# Patient Record
Sex: MALE | Race: WHITE | NOT HISPANIC OR LATINO | URBAN - METROPOLITAN AREA
[De-identification: names, ages, dates, MRNs, and addresses within clinical notes are randomized per-mention and may not be internally consistent; named-entity substitution may affect disease eponyms.]

---

## 2017-08-05 ENCOUNTER — EMERGENCY (EMERGENCY)
Facility: HOSPITAL | Age: 67
LOS: 1 days | Discharge: HOME | End: 2017-08-05
Attending: EMERGENCY MEDICINE | Admitting: EMERGENCY MEDICINE
Payer: MEDICARE

## 2017-08-05 VITALS
DIASTOLIC BLOOD PRESSURE: 92 MMHG | OXYGEN SATURATION: 98 % | HEART RATE: 88 BPM | WEIGHT: 145.06 LBS | RESPIRATION RATE: 18 BRPM | SYSTOLIC BLOOD PRESSURE: 130 MMHG | TEMPERATURE: 98 F

## 2017-08-05 DIAGNOSIS — R33.9 RETENTION OF URINE, UNSPECIFIED: ICD-10-CM

## 2017-08-05 DIAGNOSIS — R31.9 HEMATURIA, UNSPECIFIED: ICD-10-CM

## 2017-08-05 LAB
ALBUMIN SERPL ELPH-MCNC: 4.5 G/DL — SIGNIFICANT CHANGE UP (ref 3.3–5)
ALP SERPL-CCNC: 59 U/L — SIGNIFICANT CHANGE UP (ref 40–120)
ALT FLD-CCNC: 24 U/L — SIGNIFICANT CHANGE UP (ref 10–45)
ANION GAP SERPL CALC-SCNC: 13 MMOL/L — SIGNIFICANT CHANGE UP (ref 5–17)
AST SERPL-CCNC: 32 U/L — SIGNIFICANT CHANGE UP (ref 10–40)
BASOPHILS NFR BLD AUTO: 0.2 % — SIGNIFICANT CHANGE UP (ref 0–2)
BILIRUB SERPL-MCNC: 0.6 MG/DL — SIGNIFICANT CHANGE UP (ref 0.2–1.2)
BUN SERPL-MCNC: 22 MG/DL — SIGNIFICANT CHANGE UP (ref 7–23)
CALCIUM SERPL-MCNC: 10.1 MG/DL — SIGNIFICANT CHANGE UP (ref 8.4–10.5)
CHLORIDE SERPL-SCNC: 101 MMOL/L — SIGNIFICANT CHANGE UP (ref 96–108)
CO2 SERPL-SCNC: 25 MMOL/L — SIGNIFICANT CHANGE UP (ref 22–31)
CREAT SERPL-MCNC: 0.9 MG/DL — SIGNIFICANT CHANGE UP (ref 0.5–1.3)
EOSINOPHIL NFR BLD AUTO: 2 % — SIGNIFICANT CHANGE UP (ref 0–6)
GLUCOSE SERPL-MCNC: 155 MG/DL — HIGH (ref 70–99)
HCT VFR BLD CALC: 40.6 % — SIGNIFICANT CHANGE UP (ref 39–50)
HGB BLD-MCNC: 14.3 G/DL — SIGNIFICANT CHANGE UP (ref 13–17)
LYMPHOCYTES # BLD AUTO: 36.5 % — SIGNIFICANT CHANGE UP (ref 13–44)
MCHC RBC-ENTMCNC: 31.2 PG — SIGNIFICANT CHANGE UP (ref 27–34)
MCHC RBC-ENTMCNC: 35.2 G/DL — SIGNIFICANT CHANGE UP (ref 32–36)
MCV RBC AUTO: 88.6 FL — SIGNIFICANT CHANGE UP (ref 80–100)
MONOCYTES NFR BLD AUTO: 12.9 % — SIGNIFICANT CHANGE UP (ref 2–14)
NEUTROPHILS NFR BLD AUTO: 48.4 % — SIGNIFICANT CHANGE UP (ref 43–77)
PLATELET # BLD AUTO: 148 K/UL — LOW (ref 150–400)
POTASSIUM SERPL-MCNC: 4.4 MMOL/L — SIGNIFICANT CHANGE UP (ref 3.5–5.3)
POTASSIUM SERPL-SCNC: 4.4 MMOL/L — SIGNIFICANT CHANGE UP (ref 3.5–5.3)
PROT SERPL-MCNC: 7 G/DL — SIGNIFICANT CHANGE UP (ref 6–8.3)
RBC # BLD: 4.58 M/UL — SIGNIFICANT CHANGE UP (ref 4.2–5.8)
RBC # FLD: 12.5 % — SIGNIFICANT CHANGE UP (ref 10.3–16.9)
SODIUM SERPL-SCNC: 139 MMOL/L — SIGNIFICANT CHANGE UP (ref 135–145)
WBC # BLD: 4.9 K/UL — SIGNIFICANT CHANGE UP (ref 3.8–10.5)
WBC # FLD AUTO: 4.9 K/UL — SIGNIFICANT CHANGE UP (ref 3.8–10.5)

## 2017-08-05 PROCEDURE — 74176 CT ABD & PELVIS W/O CONTRAST: CPT | Mod: 26

## 2017-08-05 PROCEDURE — 99284 EMERGENCY DEPT VISIT MOD MDM: CPT

## 2017-08-05 RX ORDER — CIPROFLOXACIN LACTATE 400MG/40ML
500 VIAL (ML) INTRAVENOUS ONCE
Qty: 0 | Refills: 0 | Status: COMPLETED | OUTPATIENT
Start: 2017-08-05 | End: 2017-08-05

## 2017-08-05 RX ORDER — MOXIFLOXACIN HYDROCHLORIDE TABLETS, 400 MG 400 MG/1
1 TABLET, FILM COATED ORAL
Qty: 6 | Refills: 0 | OUTPATIENT
Start: 2017-08-05 | End: 2017-08-08

## 2017-08-05 NOTE — ED PROVIDER NOTE - ATTENDING CONTRIBUTION TO CARE
67 y/o male with hx of kidney stones, enlarged prostate, HTN c/o urinary retention. pt states hematuria began yesterday and began to improve today. + clots.  Pt unable to urinate since 4 pm - unable to put in maciel in ED, urology called and maciel placed - ct findings reviewed and pt aware of possible mass, understands importance of follow up for cystoscopy as outpatient.  leg bag applied and cipro given as per urology

## 2017-08-05 NOTE — ED ADULT NURSE NOTE - CHPI ED SYMPTOMS NEG
no blood in stool/no diarrhea/no abdominal distension/no burning urination/no fever/no chills/no nausea/no vomiting

## 2017-08-05 NOTE — CONSULT NOTE ADULT - SUBJECTIVE AND OBJECTIVE BOX
66 y.o. M patient presenting with urinary retention since 4pm today.  Patient notes having hematuria and difficulty voiding yesterday, but it initially improved with increased po water intake.  He has sensation of full bladder, but denies pain.  No dysuria.  No N/V/D/F.  No other urinary symptoms.  No recent  surgery.  Had a prostate biopsy in January, which patient states was negative.  H/o kidney stones s/p laser surgery 20 years ago.    PAST MEDICAL & SURGICAL HISTORY:  Enlarged prostate  Calculus of kidney: Nephrolithiasis    ALLERGIES:  No Known Allergies    Vital Signs Last 24 Hrs  T(C): 36.9 (05 Aug 2017 21:01), Max: 36.9 (05 Aug 2017 21:01)  T(F): 98.4 (05 Aug 2017 21:01), Max: 98.4 (05 Aug 2017 21:01)  HR: 88 (05 Aug 2017 21:01) (88 - 88)  BP: 130/92 (05 Aug 2017 21:01) (130/92 - 130/92)  BP(mean): --  RR: 18 (05 Aug 2017 21:01) (18 - 18)  SpO2: 98% (05 Aug 2017 21:01) (98% - 98%)                          14.3   4.9   )-----------( 148      ( 05 Aug 2017 21:50 )             40.6   08-05    139  |  101  |  22  ----------------------------<  155<H>  4.4   |  25  |  0.90    Ca    10.1      05 Aug 2017 21:50    TPro  7.0  /  Alb  4.5  /  TBili  0.6  /  DBili  x   /  AST  32  /  ALT  24  /  AlkPhos  59  08-05      CT abd/pel: IMPRESSION:   1.  No renal or ureteral calculus. Markedly distended urinary bladder   with an intrinsic 0.4 cm stone. 4 cm posterior bladder diverticulum.  2.  Irregular soft tissue density overlying the right/inferior aspect of   the urinary bladder. This raises suspicion for a bladder neoplasm in the   setting of hematuria. Recommend either nonemergent CT urogram and/or   cystoscopy for further characterization.  3.  Prostatomegaly.        Gen: AAOx3, partner at bedside  Abd: soft, suprapubic distension, mild tender, no guarding or ridigity  : 18F coude placed, with 400cc draining to bag.  Several large clots removed with irrigation. Now draining clear, pink color urine. 66 y.o. M patient presenting with urinary retention since 4pm today.  Patient notes having hematuria and difficulty voiding yesterday, but it initially improved with increased po water intake.  He has sensation of full bladder, but denies pain.  No dysuria.  No N/V/D/F.  No other urinary symptoms.  No recent  surgery.  Had a prostate biopsy in January, which patient states was negative.  H/o kidney stones s/p laser surgery 20 years ago.  Outside urologist is at [facility] [place] (?Dr Poon)    PAST MEDICAL & SURGICAL HISTORY:  Enlarged prostate  Calculus of kidney: Nephrolithiasis    ALLERGIES:  No Known Allergies    Vital Signs Last 24 Hrs  T(C): 36.9 (05 Aug 2017 21:01), Max: 36.9 (05 Aug 2017 21:01)  T(F): 98.4 (05 Aug 2017 21:01), Max: 98.4 (05 Aug 2017 21:01)  HR: 88 (05 Aug 2017 21:01) (88 - 88)  BP: 130/92 (05 Aug 2017 21:01) (130/92 - 130/92)  BP(mean): --  RR: 18 (05 Aug 2017 21:01) (18 - 18)  SpO2: 98% (05 Aug 2017 21:01) (98% - 98%)                          14.3   4.9   )-----------( 148      ( 05 Aug 2017 21:50 )             40.6   08-05    139  |  101  |  22  ----------------------------<  155<H>  4.4   |  25  |  0.90    Ca    10.1      05 Aug 2017 21:50    TPro  7.0  /  Alb  4.5  /  TBili  0.6  /  DBili  x   /  AST  32  /  ALT  24  /  AlkPhos  59  08-05      CT abd/pel: IMPRESSION:   1.  No renal or ureteral calculus. Markedly distended urinary bladder   with an intrinsic 0.4 cm stone. 4 cm posterior bladder diverticulum.  2.  Irregular soft tissue density overlying the right/inferior aspect of   the urinary bladder. This raises suspicion for a bladder neoplasm in the   setting of hematuria. Recommend either nonemergent CT urogram and/or   cystoscopy for further characterization.  3.  Prostatomegaly.        Gen: AAOx3, partner at bedside  Abd: soft, suprapubic distension, mild tender, no guarding or ridigity  : 18F coude placed, with 400cc draining to bag.  Several large clots removed with irrigation. Now draining clear, peach to pink color urine.

## 2017-08-05 NOTE — ED PROVIDER NOTE - OBJECTIVE STATEMENT
67 y/o male with hx of kidney stones, enlarged prostate, HTN c/o urinary retention. pt states hematuria began yesterday and began to improve today. + clots. pt reports unable to urinate since 4 pm today. no fever or chills. no abd pain, back pain, n/v. no testicular pain. no recent prostate procedures. no further complaints.

## 2017-08-05 NOTE — ED ADULT NURSE NOTE - OBJECTIVE STATEMENT
pt. with hx of BPH presented with c/o urinary retention since 4pm today, pt. is A&Ox3, communicates w/o difficulty, denies chest pain, shortness of breath, n/v/d, back pain, fever, chills.

## 2017-08-05 NOTE — CONSULT NOTE ADULT - ASSESSMENT
66 y.o. M patient presenting with urinary clot retention.  CT findings showing large bladder stone and questionable soft tissue mass in bladder. 66 y.o. M patient presenting with urinary clot retention.  CT findings showing large bladder stone, bladder diverticulum, and questionable soft tissue mass in bladder.

## 2017-08-05 NOTE — ED ADULT NURSE REASSESSMENT NOTE - NS ED NURSE REASSESS COMMENT FT1
Attempted to insert Coudee cath #16, resistance met, ELLIOT Rodriges aware, awaiting urology consult.

## 2017-08-05 NOTE — CONSULT NOTE ADULT - PROBLEM SELECTOR RECOMMENDATION 9
-Case discussed with on call  resident/chief.    -CT finding could represent mass vs clot, will need outpatient work up including CT Urogram and Cystoscopy.  -Please send Ua, Ucx, and Urine cytology  -Cipro x 3 days  -Follow up with Dr Shannon outpatient this week, home with maciel to leg bag if urine output remains clear  -Return if maciel not draining, pain, or worsening symptoms -Case discussed with on call  resident/chief.    -CT finding could represent mass vs clot (imaging was done prior to maciel placement and irrigation), will need further outpatient work up including CT Urogram and Cystoscopy.  -Please send Ua, Ucx, and Urine cytology  -Cipro x 3 days  -Follow up with Dr Shannon outpatient this week, home with maciel to leg bag if urine output remains clear  -Patient offered option for admission for observation, he declines at present.  Would prefer to monitor from home.  Return precautions given if maciel not draining, pain, or worsening symptoms.  Educated to drink plenty water.

## 2017-08-05 NOTE — ED PROVIDER NOTE - MEDICAL DECISION MAKING DETAILS
hematuria and urinary retention. pt well appearing. non tender on exam. a febrile. unable to place maciel by RN. Urology consulted and maciel placed. labs noted. ct scan done given hx of stone. + stone in bladder and ? soft tissue mass in bladder. results d/w pt and copy of report given to pt. f/u with urology this week for outpt cystoscopy. urology recommends cipro x 3 days. cx sent

## 2017-08-06 ENCOUNTER — EMERGENCY (EMERGENCY)
Facility: HOSPITAL | Age: 67
LOS: 1 days | Discharge: HOME | End: 2017-08-06
Attending: EMERGENCY MEDICINE | Admitting: EMERGENCY MEDICINE
Payer: MEDICARE

## 2017-08-06 VITALS
DIASTOLIC BLOOD PRESSURE: 80 MMHG | SYSTOLIC BLOOD PRESSURE: 124 MMHG | HEART RATE: 82 BPM | OXYGEN SATURATION: 100 % | RESPIRATION RATE: 18 BRPM | TEMPERATURE: 98 F

## 2017-08-06 VITALS
SYSTOLIC BLOOD PRESSURE: 123 MMHG | HEART RATE: 78 BPM | HEIGHT: 67 IN | WEIGHT: 145.06 LBS | DIASTOLIC BLOOD PRESSURE: 83 MMHG | TEMPERATURE: 98 F | RESPIRATION RATE: 18 BRPM

## 2017-08-06 DIAGNOSIS — Z79.2 LONG TERM (CURRENT) USE OF ANTIBIOTICS: ICD-10-CM

## 2017-08-06 DIAGNOSIS — R31.9 HEMATURIA, UNSPECIFIED: ICD-10-CM

## 2017-08-06 DIAGNOSIS — R33.9 RETENTION OF URINE, UNSPECIFIED: ICD-10-CM

## 2017-08-06 LAB
APPEARANCE UR: (no result)
BACTERIA # UR AUTO: PRESENT /HPF
BILIRUB UR-MCNC: NEGATIVE — SIGNIFICANT CHANGE UP
COLOR SPEC: (no result)
DIFF PNL FLD: (no result)
EPI CELLS # UR: SIGNIFICANT CHANGE UP /HPF
GLUCOSE UR QL: NEGATIVE — SIGNIFICANT CHANGE UP
KETONES UR-MCNC: NEGATIVE — SIGNIFICANT CHANGE UP
LEUKOCYTE ESTERASE UR-ACNC: (no result)
NITRITE UR-MCNC: NEGATIVE — SIGNIFICANT CHANGE UP
PH UR: 7 — SIGNIFICANT CHANGE UP (ref 5–8)
PROT UR-MCNC: >=300 MG/DL
RBC CASTS # UR COMP ASSIST: (no result) /HPF
SP GR SPEC: 1.01 — SIGNIFICANT CHANGE UP (ref 1–1.03)
UROBILINOGEN FLD QL: 0.2 E.U./DL — SIGNIFICANT CHANGE UP
WBC UR QL: (no result) /HPF

## 2017-08-06 PROCEDURE — 51702 INSERT TEMP BLADDER CATH: CPT

## 2017-08-06 PROCEDURE — 80053 COMPREHEN METABOLIC PANEL: CPT

## 2017-08-06 PROCEDURE — 85025 COMPLETE CBC W/AUTO DIFF WBC: CPT

## 2017-08-06 PROCEDURE — 87086 URINE CULTURE/COLONY COUNT: CPT

## 2017-08-06 PROCEDURE — 99283 EMERGENCY DEPT VISIT LOW MDM: CPT | Mod: 25

## 2017-08-06 PROCEDURE — 74176 CT ABD & PELVIS W/O CONTRAST: CPT

## 2017-08-06 PROCEDURE — 99283 EMERGENCY DEPT VISIT LOW MDM: CPT

## 2017-08-06 PROCEDURE — 81001 URINALYSIS AUTO W/SCOPE: CPT

## 2017-08-06 PROCEDURE — 99284 EMERGENCY DEPT VISIT MOD MDM: CPT | Mod: 25

## 2017-08-06 RX ADMIN — Medication 500 MILLIGRAM(S): at 00:18

## 2017-08-06 NOTE — ED ADULT NURSE NOTE - OBJECTIVE STATEMENT
Pt c/o urine leakage around meatus status post  Khan insertion on 08/05/2017. Pt had 18 Fr Khan catheter. Notable blood in the at the lenin of the leg bag. No urine present in the leg bad. Last emptied "this morning." Pt denies pain, fever chills. Leg bad changes to bed side beg. While changing two blood clots came out of the distal end of catheter and dark yellow urines started to flow. Total of 800  mL of urine produced. Pt verbalized feeling better. No distress noted. Pending MD evaluation.

## 2017-08-06 NOTE — ED ADULT TRIAGE NOTE - OTHER COMPLAINTS
pt c/o leaking around catheter site since 10 am this morning. pt maciel cath placed last night 8/5/17. pt c/o pelvic discomfort 5/10. no n/v/d. no fever or chills.

## 2017-08-06 NOTE — ED PROVIDER NOTE - MEDICAL DECISION MAKING DETAILS
67yo male with urinary retention secondary to hematuria/ clot formation. Catheter was replaced with large clot removed from maciel. Now draining dark but clear urine. Pt feeling better. Instructed him to follow up with Dr. Shannon in 1-2days for removal.

## 2017-08-06 NOTE — ED PROVIDER NOTE - OBJECTIVE STATEMENT
65yo male with BPH, bladder stone presenting with urinary retention 8/5 back today with leaking of maciel around meatus. Pt also complains of decreased urine in bag as well as urinary retention. Pt denies fever, chills or back pain.

## 2017-08-06 NOTE — ED ADULT NURSE REASSESSMENT NOTE - NS ED NURSE REASSESS COMMENT FT1
Pt educated on proper use of leg bag with return demonstration. No distress noted. Bed side bag changed to leg bag.

## 2017-08-08 LAB
CULTURE RESULTS: NO GROWTH — SIGNIFICANT CHANGE UP
SPECIMEN SOURCE: SIGNIFICANT CHANGE UP

## 2017-11-24 ENCOUNTER — TRANSCRIPTION ENCOUNTER (OUTPATIENT)
Age: 67
End: 2017-11-24

## 2017-11-26 ENCOUNTER — FORM ENCOUNTER (OUTPATIENT)
Age: 67
End: 2017-11-26

## 2017-11-27 ENCOUNTER — APPOINTMENT (OUTPATIENT)
Dept: ORTHOPEDIC SURGERY | Facility: CLINIC | Age: 67
End: 2017-11-27
Payer: MEDICARE

## 2017-11-27 ENCOUNTER — APPOINTMENT (OUTPATIENT)
Dept: RADIOLOGY | Facility: CLINIC | Age: 67
End: 2017-11-27
Payer: MEDICARE

## 2017-11-27 ENCOUNTER — OUTPATIENT (OUTPATIENT)
Dept: OUTPATIENT SERVICES | Facility: HOSPITAL | Age: 67
LOS: 1 days | End: 2017-11-27

## 2017-11-27 VITALS — HEIGHT: 67 IN | BODY MASS INDEX: 22.29 KG/M2 | WEIGHT: 142 LBS

## 2017-11-27 DIAGNOSIS — Z86.39 PERSONAL HISTORY OF OTHER ENDOCRINE, NUTRITIONAL AND METABOLIC DISEASE: ICD-10-CM

## 2017-11-27 DIAGNOSIS — Z78.9 OTHER SPECIFIED HEALTH STATUS: ICD-10-CM

## 2017-11-27 PROCEDURE — 99204 OFFICE O/P NEW MOD 45 MIN: CPT | Mod: 57

## 2017-11-27 PROCEDURE — 27780 TREATMENT OF FIBULA FRACTURE: CPT | Mod: LT

## 2017-11-27 PROCEDURE — 73610 X-RAY EXAM OF ANKLE: CPT | Mod: 26,LT

## 2017-11-29 ENCOUNTER — APPOINTMENT (OUTPATIENT)
Dept: ORTHOPEDIC SURGERY | Facility: CLINIC | Age: 67
End: 2017-11-29

## 2017-11-29 PROBLEM — Z86.39 HISTORY OF HIGH CHOLESTEROL: Status: RESOLVED | Noted: 2017-11-27 | Resolved: 2017-11-29

## 2017-11-29 PROBLEM — Z78.9 REGULAR ALCOHOL CONSUMPTION: Status: ACTIVE | Noted: 2017-11-27

## 2017-12-15 ENCOUNTER — TRANSCRIPTION ENCOUNTER (OUTPATIENT)
Age: 67
End: 2017-12-15

## 2017-12-17 ENCOUNTER — FORM ENCOUNTER (OUTPATIENT)
Age: 67
End: 2017-12-17

## 2017-12-18 ENCOUNTER — OUTPATIENT (OUTPATIENT)
Dept: OUTPATIENT SERVICES | Facility: HOSPITAL | Age: 67
LOS: 1 days | End: 2017-12-18

## 2017-12-18 ENCOUNTER — APPOINTMENT (OUTPATIENT)
Dept: RADIOLOGY | Facility: CLINIC | Age: 67
End: 2017-12-18
Payer: MEDICARE

## 2017-12-18 ENCOUNTER — APPOINTMENT (OUTPATIENT)
Dept: ORTHOPEDIC SURGERY | Facility: CLINIC | Age: 67
End: 2017-12-18
Payer: MEDICARE

## 2017-12-18 VITALS — BODY MASS INDEX: 22.29 KG/M2 | RESPIRATION RATE: 16 BRPM | HEIGHT: 67 IN | WEIGHT: 142 LBS

## 2017-12-18 DIAGNOSIS — M85.89 OTHER SPECIFIED DISORDERS OF BONE DENSITY AND STRUCTURE, MULTIPLE SITES: ICD-10-CM

## 2017-12-18 PROCEDURE — 73610 X-RAY EXAM OF ANKLE: CPT | Mod: 26,LT

## 2017-12-18 PROCEDURE — 99213 OFFICE O/P EST LOW 20 MIN: CPT | Mod: 24

## 2017-12-18 PROCEDURE — 73610 X-RAY EXAM OF ANKLE: CPT | Mod: LT

## 2017-12-18 PROCEDURE — 73590 X-RAY EXAM OF LOWER LEG: CPT | Mod: 26,LT

## 2017-12-18 PROCEDURE — 73590 X-RAY EXAM OF LOWER LEG: CPT | Mod: LT

## 2017-12-25 ENCOUNTER — FORM ENCOUNTER (OUTPATIENT)
Age: 67
End: 2017-12-25

## 2017-12-26 ENCOUNTER — OUTPATIENT (OUTPATIENT)
Dept: OUTPATIENT SERVICES | Facility: HOSPITAL | Age: 67
LOS: 1 days | End: 2017-12-26

## 2017-12-26 ENCOUNTER — APPOINTMENT (OUTPATIENT)
Dept: ULTRASOUND IMAGING | Facility: CLINIC | Age: 67
End: 2017-12-26
Payer: MEDICARE

## 2017-12-26 ENCOUNTER — APPOINTMENT (OUTPATIENT)
Dept: RADIOLOGY | Facility: CLINIC | Age: 67
End: 2017-12-26
Payer: MEDICARE

## 2017-12-26 ENCOUNTER — APPOINTMENT (OUTPATIENT)
Dept: ORTHOPEDIC SURGERY | Facility: CLINIC | Age: 67
End: 2017-12-26
Payer: MEDICARE

## 2017-12-26 VITALS — WEIGHT: 142 LBS | HEIGHT: 67 IN | RESPIRATION RATE: 16 BRPM | BODY MASS INDEX: 22.29 KG/M2

## 2017-12-26 DIAGNOSIS — M25.562 PAIN IN LEFT KNEE: ICD-10-CM

## 2017-12-26 PROCEDURE — 99214 OFFICE O/P EST MOD 30 MIN: CPT | Mod: 24

## 2017-12-26 PROCEDURE — 73564 X-RAY EXAM KNEE 4 OR MORE: CPT | Mod: 26,LT

## 2017-12-26 PROCEDURE — 93971 EXTREMITY STUDY: CPT | Mod: 26,LT

## 2018-01-02 ENCOUNTER — APPOINTMENT (OUTPATIENT)
Dept: ORTHOPEDIC SURGERY | Facility: CLINIC | Age: 68
End: 2018-01-02
Payer: MEDICARE

## 2018-01-02 DIAGNOSIS — M25.462 EFFUSION, LEFT KNEE: ICD-10-CM

## 2018-01-02 PROCEDURE — 20610 DRAIN/INJ JOINT/BURSA W/O US: CPT | Mod: 58,LT

## 2018-01-02 PROCEDURE — 99024 POSTOP FOLLOW-UP VISIT: CPT

## 2018-01-10 ENCOUNTER — APPOINTMENT (OUTPATIENT)
Dept: VASCULAR SURGERY | Facility: CLINIC | Age: 68
End: 2018-01-10
Payer: MEDICARE

## 2018-01-10 VITALS
OXYGEN SATURATION: 98 % | TEMPERATURE: 98.2 F | DIASTOLIC BLOOD PRESSURE: 90 MMHG | HEART RATE: 108 BPM | SYSTOLIC BLOOD PRESSURE: 129 MMHG

## 2018-01-10 DIAGNOSIS — I83.893 VARICOSE VEINS OF BILATERAL LOWER EXTREMITIES WITH OTHER COMPLICATIONS: ICD-10-CM

## 2018-01-10 DIAGNOSIS — I83.12 VARICOSE VEINS OF RIGHT LOWER EXTREMITY WITH INFLAMMATION: ICD-10-CM

## 2018-01-10 DIAGNOSIS — I83.11 VARICOSE VEINS OF RIGHT LOWER EXTREMITY WITH INFLAMMATION: ICD-10-CM

## 2018-01-10 DIAGNOSIS — M79.89 OTHER SPECIFIED SOFT TISSUE DISORDERS: ICD-10-CM

## 2018-01-10 LAB — CRYSTALS SNV QL MICRO: NORMAL

## 2018-01-10 PROCEDURE — 99215 OFFICE O/P EST HI 40 MIN: CPT | Mod: 25

## 2018-01-10 PROCEDURE — 93970 EXTREMITY STUDY: CPT

## 2018-01-16 ENCOUNTER — APPOINTMENT (OUTPATIENT)
Dept: VASCULAR SURGERY | Facility: CLINIC | Age: 68
End: 2018-01-16
Payer: MEDICARE

## 2018-01-16 VITALS
HEART RATE: 95 BPM | TEMPERATURE: 97.8 F | SYSTOLIC BLOOD PRESSURE: 148 MMHG | DIASTOLIC BLOOD PRESSURE: 100 MMHG | OXYGEN SATURATION: 96 %

## 2018-01-16 PROCEDURE — 36478 ENDOVENOUS LASER 1ST VEIN: CPT | Mod: LT

## 2018-01-17 ENCOUNTER — APPOINTMENT (OUTPATIENT)
Dept: ORTHOPEDIC SURGERY | Facility: CLINIC | Age: 68
End: 2018-01-17

## 2018-01-21 ENCOUNTER — FORM ENCOUNTER (OUTPATIENT)
Age: 68
End: 2018-01-21

## 2018-01-22 ENCOUNTER — EMERGENCY (EMERGENCY)
Facility: HOSPITAL | Age: 68
LOS: 1 days | Discharge: HOME | End: 2018-01-22
Admitting: EMERGENCY MEDICINE
Payer: MEDICARE

## 2018-01-22 ENCOUNTER — APPOINTMENT (OUTPATIENT)
Dept: ORTHOPEDIC SURGERY | Facility: CLINIC | Age: 68
End: 2018-01-22
Payer: MEDICARE

## 2018-01-22 ENCOUNTER — APPOINTMENT (OUTPATIENT)
Dept: RADIOLOGY | Facility: CLINIC | Age: 68
End: 2018-01-22
Payer: MEDICARE

## 2018-01-22 ENCOUNTER — OUTPATIENT (OUTPATIENT)
Dept: OUTPATIENT SERVICES | Facility: HOSPITAL | Age: 68
LOS: 1 days | End: 2018-01-22

## 2018-01-22 VITALS
RESPIRATION RATE: 16 BRPM | SYSTOLIC BLOOD PRESSURE: 138 MMHG | WEIGHT: 142.64 LBS | DIASTOLIC BLOOD PRESSURE: 89 MMHG | OXYGEN SATURATION: 99 % | HEART RATE: 84 BPM | TEMPERATURE: 98 F

## 2018-01-22 VITALS — HEIGHT: 67 IN | WEIGHT: 142 LBS | RESPIRATION RATE: 16 BRPM | BODY MASS INDEX: 22.29 KG/M2

## 2018-01-22 DIAGNOSIS — M80.00XA AGE-RELATED OSTEOPOROSIS WITH CURRENT PATHOLOGICAL FRACTURE, UNSPECIFIED SITE, INITIAL ENCOUNTER FOR FRACTURE: ICD-10-CM

## 2018-01-22 DIAGNOSIS — S82.865A NONDISPLACED MAISONNEUVE'S FRACTURE OF LEFT LEG, INITIAL ENCOUNTER FOR CLOSED FRACTURE: ICD-10-CM

## 2018-01-22 LAB
ANION GAP SERPL CALC-SCNC: 3 MMOL/L — LOW (ref 9–16)
BASOPHILS NFR BLD AUTO: 0.5 % — SIGNIFICANT CHANGE UP (ref 0–2)
BUN SERPL-MCNC: 13 MG/DL — SIGNIFICANT CHANGE UP (ref 7–23)
CALCIUM SERPL-MCNC: 9.4 MG/DL — SIGNIFICANT CHANGE UP (ref 8.5–10.5)
CHLORIDE SERPL-SCNC: 104 MMOL/L — SIGNIFICANT CHANGE UP (ref 96–108)
CO2 SERPL-SCNC: 31 MMOL/L — SIGNIFICANT CHANGE UP (ref 22–31)
CREAT SERPL-MCNC: 0.85 MG/DL — SIGNIFICANT CHANGE UP (ref 0.5–1.3)
CRP SERPL-MCNC: <0.2 MG/DL — SIGNIFICANT CHANGE UP (ref 0–0.9)
EOSINOPHIL NFR BLD AUTO: 1.5 % — SIGNIFICANT CHANGE UP (ref 0–6)
GLUCOSE SERPL-MCNC: 97 MG/DL — SIGNIFICANT CHANGE UP (ref 70–99)
HCT VFR BLD CALC: 42.2 % — SIGNIFICANT CHANGE UP (ref 39–50)
HGB BLD-MCNC: 14 G/DL — SIGNIFICANT CHANGE UP (ref 13–17)
IMM GRANULOCYTES NFR BLD AUTO: 0.5 % — SIGNIFICANT CHANGE UP (ref 0–1.5)
LYMPHOCYTES # BLD AUTO: 25.7 % — SIGNIFICANT CHANGE UP (ref 13–44)
MCHC RBC-ENTMCNC: 30.7 PG — SIGNIFICANT CHANGE UP (ref 27–34)
MCHC RBC-ENTMCNC: 33.2 G/DL — SIGNIFICANT CHANGE UP (ref 32–36)
MCV RBC AUTO: 92.5 FL — SIGNIFICANT CHANGE UP (ref 80–100)
MONOCYTES NFR BLD AUTO: 11.4 % — SIGNIFICANT CHANGE UP (ref 2–14)
NEUTROPHILS NFR BLD AUTO: 60.4 % — SIGNIFICANT CHANGE UP (ref 43–77)
PLATELET # BLD AUTO: 164 K/UL — SIGNIFICANT CHANGE UP (ref 150–400)
POTASSIUM SERPL-MCNC: 4.2 MMOL/L — SIGNIFICANT CHANGE UP (ref 3.5–5.3)
POTASSIUM SERPL-SCNC: 4.2 MMOL/L — SIGNIFICANT CHANGE UP (ref 3.5–5.3)
RBC # BLD: 4.56 M/UL — SIGNIFICANT CHANGE UP (ref 4.2–5.8)
RBC # FLD: 12.6 % — SIGNIFICANT CHANGE UP (ref 10.3–16.9)
SODIUM SERPL-SCNC: 138 MMOL/L — SIGNIFICANT CHANGE UP (ref 132–145)
WBC # BLD: 4 K/UL — SIGNIFICANT CHANGE UP (ref 3.8–10.5)
WBC # FLD AUTO: 4 K/UL — SIGNIFICANT CHANGE UP (ref 3.8–10.5)

## 2018-01-22 PROCEDURE — 99214 OFFICE O/P EST MOD 30 MIN: CPT | Mod: 24

## 2018-01-22 PROCEDURE — 99284 EMERGENCY DEPT VISIT MOD MDM: CPT

## 2018-01-22 PROCEDURE — 93971 EXTREMITY STUDY: CPT | Mod: 26,LT

## 2018-01-22 PROCEDURE — 77080 DXA BONE DENSITY AXIAL: CPT | Mod: 26

## 2018-01-22 RX ORDER — KETOROLAC TROMETHAMINE 30 MG/ML
60 SYRINGE (ML) INJECTION ONCE
Qty: 0 | Refills: 0 | Status: DISCONTINUED | OUTPATIENT
Start: 2018-01-22 | End: 2018-01-22

## 2018-01-22 RX ORDER — DICLOFENAC SODIUM 75 MG/1
1 TABLET, DELAYED RELEASE ORAL
Qty: 20 | Refills: 0 | OUTPATIENT
Start: 2018-01-22 | End: 2018-02-20

## 2018-01-22 RX ORDER — ATORVASTATIN CALCIUM 80 MG/1
0 TABLET, FILM COATED ORAL
Qty: 0 | Refills: 0 | COMMUNITY

## 2018-01-22 RX ORDER — LOSARTAN POTASSIUM 100 MG/1
1 TABLET, FILM COATED ORAL
Qty: 0 | Refills: 0 | COMMUNITY

## 2018-01-22 RX ADMIN — Medication 60 MILLIGRAM(S): at 13:09

## 2018-01-22 NOTE — ED PROVIDER NOTE - PHYSICAL EXAMINATION
Gen - WDWN, NAD, comfortable and non-toxic appearing  Skin - warm, dry, procedural site C/D/I   CV - S1S2, R/R/R  Resp - CTAB, no r/r/w   GI - NABS, soft, NT   MS - w/w/p, LLE with mild edema and erythema extending to the L foot with slight warmth and TTP, no crepitus or rash   Neuro - AxOx3, ambulatory without gait disturbance

## 2018-01-22 NOTE — ED ADULT TRIAGE NOTE - CHIEF COMPLAINT QUOTE
s/p recent laser procedure of veins left leg with vascular surgeon Dr Noble and now c/o left foot swelling, sent for r/o DVT

## 2018-01-22 NOTE — ED ADULT NURSE NOTE - CHPI ED SYMPTOMS NEG
no numbness/no tingling/no weakness/no bruising/no abrasion/no back pain/no fever/no deformity/no difficulty bearing weight

## 2018-01-22 NOTE — ED ADULT NURSE NOTE - OBJECTIVE STATEMENT
Pt c/o left Pt c/o left calf and foot pain/swelling s/p ELVT on 1/16/18. Pt c/o left calf and foot pain/swelling s/p ELVT on 1/16/18. Pt presents to ED with left foot and calf swelling and redness, no pain or tenderness. Pt denies any CP/SOB, no N/V/D, no fever/chills. Pt has +bounding pulses and good cap refill.

## 2018-01-22 NOTE — ED PROVIDER NOTE - MEDICAL DECISION MAKING DETAILS
AFVSS at time of d/c, pt non-toxic appearing and hemodynamically stable, results, ddx, and f/u plans discussed with pt at bedside, d/c'd home to f/u with PMD, strict return precautions discussed, prompt return to ER for any worsening or new sx, pt verbalized understanding. pt s/p vein stripping to the LLE, now with increased edema to the foot, slight erythema to the foot but no warmth or crepitus or fluctuance, US neg for DVT, labs unremarkable, encouraged supportive care, elevation, compression stocking, NSAID, and prompt f/u with vascular, pt has appt with vascular surgery tmr, strict return precautions discussed, prompt return to ER for any worsening or new sx, pt verbalized understanding.

## 2018-01-22 NOTE — ED PROVIDER NOTE - OBJECTIVE STATEMENT
66 yo M with PMHx of BPH and kidney stones, recent laser surgery to the LLE and now with increased swelling. Denies fever, chills, SOB, trauma, fall, CP, orthopnea, palpitations, focal weakness, sore throat, tinnitus, HA, dizziness, N/V/D/C, abdominal pain, change in urinary/bowel function, night sweats, and malaise. 68 yo M with PMHx of BPH and kidney stones, recent laser surgery/vein stripping to the LLE 1 wk ago and now with increased swelling and pain. Pt reports using compression stocking after the procedure but noted worsening pain and swelling extending to the L foot with mild redness and warmth. Denies fever, chills, SOB, trauma, fall, CP, orthopnea, palpitations, focal weakness, sore throat, tinnitus, HA, dizziness, N/V/D/C, abdominal pain, change in urinary/bowel function, night sweats, and malaise.

## 2018-01-23 ENCOUNTER — RECORD ABSTRACTING (OUTPATIENT)
Age: 68
End: 2018-01-23

## 2018-01-23 ENCOUNTER — APPOINTMENT (OUTPATIENT)
Dept: VASCULAR SURGERY | Facility: CLINIC | Age: 68
End: 2018-01-23
Payer: MEDICARE

## 2018-01-23 VITALS
HEART RATE: 89 BPM | TEMPERATURE: 98.2 F | DIASTOLIC BLOOD PRESSURE: 87 MMHG | SYSTOLIC BLOOD PRESSURE: 136 MMHG | OXYGEN SATURATION: 95 %

## 2018-01-23 DIAGNOSIS — M79.605 PAIN IN LEFT LEG: ICD-10-CM

## 2018-01-23 DIAGNOSIS — I83.10 VARICOSE VEINS OF UNSPECIFIED LOWER EXTREMITY WITH INFLAMMATION: ICD-10-CM

## 2018-01-23 DIAGNOSIS — I83.891 VARICOSE VEINS OF RIGHT LOWER EXTREMITY WITH OTHER COMPLICATIONS: ICD-10-CM

## 2018-01-23 DIAGNOSIS — I78.1 NEVUS, NON-NEOPLASTIC: ICD-10-CM

## 2018-01-23 DIAGNOSIS — R25.2 CRAMP AND SPASM: ICD-10-CM

## 2018-01-23 DIAGNOSIS — I83.12 VARICOSE VEINS OF LEFT LOWER EXTREMITY WITH INFLAMMATION: ICD-10-CM

## 2018-01-23 PROCEDURE — 93971 EXTREMITY STUDY: CPT

## 2018-01-23 PROCEDURE — 99214 OFFICE O/P EST MOD 30 MIN: CPT | Mod: 25

## 2018-01-26 DIAGNOSIS — L53.8 OTHER SPECIFIED ERYTHEMATOUS CONDITIONS: ICD-10-CM

## 2018-01-26 DIAGNOSIS — M79.89 OTHER SPECIFIED SOFT TISSUE DISORDERS: ICD-10-CM

## 2018-01-26 DIAGNOSIS — M79.662 PAIN IN LEFT LOWER LEG: ICD-10-CM

## 2018-01-26 DIAGNOSIS — Z79.899 OTHER LONG TERM (CURRENT) DRUG THERAPY: ICD-10-CM

## 2018-01-26 DIAGNOSIS — R60.0 LOCALIZED EDEMA: ICD-10-CM

## 2018-01-28 ENCOUNTER — FORM ENCOUNTER (OUTPATIENT)
Age: 68
End: 2018-01-28

## 2018-01-29 ENCOUNTER — APPOINTMENT (OUTPATIENT)
Dept: RADIOLOGY | Facility: CLINIC | Age: 68
End: 2018-01-29

## 2018-01-29 ENCOUNTER — APPOINTMENT (OUTPATIENT)
Dept: ORTHOPEDIC SURGERY | Facility: CLINIC | Age: 68
End: 2018-01-29
Payer: MEDICARE

## 2018-01-29 ENCOUNTER — OUTPATIENT (OUTPATIENT)
Dept: OUTPATIENT SERVICES | Facility: HOSPITAL | Age: 68
LOS: 1 days | End: 2018-01-29
Payer: MEDICARE

## 2018-01-29 VITALS — BODY MASS INDEX: 22.29 KG/M2 | RESPIRATION RATE: 16 BRPM | HEIGHT: 67 IN | WEIGHT: 142 LBS

## 2018-01-29 DIAGNOSIS — S90.30XA CONTUSION OF UNSPECIFIED FOOT, INITIAL ENCOUNTER: ICD-10-CM

## 2018-01-29 PROCEDURE — 73590 X-RAY EXAM OF LOWER LEG: CPT | Mod: 26,LT

## 2018-01-29 PROCEDURE — 99214 OFFICE O/P EST MOD 30 MIN: CPT | Mod: 24

## 2018-01-29 PROCEDURE — 73610 X-RAY EXAM OF ANKLE: CPT | Mod: 26,LT

## 2018-02-01 ENCOUNTER — APPOINTMENT (OUTPATIENT)
Dept: VASCULAR SURGERY | Facility: CLINIC | Age: 68
End: 2018-02-01
Payer: MEDICARE

## 2018-02-01 VITALS
HEART RATE: 83 BPM | TEMPERATURE: 97.6 F | SYSTOLIC BLOOD PRESSURE: 146 MMHG | OXYGEN SATURATION: 97 % | DIASTOLIC BLOOD PRESSURE: 92 MMHG

## 2018-02-01 DIAGNOSIS — M79.89 OTHER SPECIFIED SOFT TISSUE DISORDERS: ICD-10-CM

## 2018-02-01 PROCEDURE — 99214 OFFICE O/P EST MOD 30 MIN: CPT | Mod: 25

## 2018-02-01 PROCEDURE — 93923 UPR/LXTR ART STDY 3+ LVLS: CPT

## 2018-02-01 PROCEDURE — 93971 EXTREMITY STUDY: CPT

## 2018-02-07 ENCOUNTER — APPOINTMENT (OUTPATIENT)
Dept: ORTHOPEDIC SURGERY | Facility: CLINIC | Age: 68
End: 2018-02-07
Payer: MEDICARE

## 2018-02-07 VITALS — WEIGHT: 142 LBS | BODY MASS INDEX: 22.29 KG/M2 | HEIGHT: 67 IN

## 2018-02-07 PROCEDURE — 99212 OFFICE O/P EST SF 10 MIN: CPT | Mod: 25,24

## 2018-02-07 PROCEDURE — 20610 DRAIN/INJ JOINT/BURSA W/O US: CPT | Mod: 79,RT

## 2018-02-08 ENCOUNTER — APPOINTMENT (OUTPATIENT)
Dept: VASCULAR SURGERY | Facility: CLINIC | Age: 68
End: 2018-02-08
Payer: MEDICARE

## 2018-02-08 VITALS
OXYGEN SATURATION: 97 % | TEMPERATURE: 97.5 F | HEART RATE: 107 BPM | DIASTOLIC BLOOD PRESSURE: 85 MMHG | SYSTOLIC BLOOD PRESSURE: 139 MMHG

## 2018-02-08 DIAGNOSIS — I89.0 LYMPHEDEMA, NOT ELSEWHERE CLASSIFIED: ICD-10-CM

## 2018-02-08 DIAGNOSIS — I83.892 VARICOSE VEINS OF LEFT LOWER EXTREMITY WITH OTHER COMPLICATIONS: ICD-10-CM

## 2018-02-08 PROCEDURE — 99213 OFFICE O/P EST LOW 20 MIN: CPT | Mod: 25

## 2018-02-08 PROCEDURE — 93971 EXTREMITY STUDY: CPT

## 2018-03-01 ENCOUNTER — CLINICAL ADVICE (OUTPATIENT)
Age: 68
End: 2018-03-01

## 2018-03-04 ENCOUNTER — FORM ENCOUNTER (OUTPATIENT)
Age: 68
End: 2018-03-04

## 2018-03-05 ENCOUNTER — APPOINTMENT (OUTPATIENT)
Dept: ORTHOPEDIC SURGERY | Facility: CLINIC | Age: 68
End: 2018-03-05
Payer: MEDICARE

## 2018-03-05 ENCOUNTER — OUTPATIENT (OUTPATIENT)
Dept: OUTPATIENT SERVICES | Facility: HOSPITAL | Age: 68
LOS: 1 days | End: 2018-03-05
Payer: MEDICARE

## 2018-03-05 ENCOUNTER — APPOINTMENT (OUTPATIENT)
Dept: RADIOLOGY | Facility: CLINIC | Age: 68
End: 2018-03-05

## 2018-03-05 VITALS — BODY MASS INDEX: 22.29 KG/M2 | RESPIRATION RATE: 16 BRPM | HEIGHT: 67 IN | WEIGHT: 142 LBS

## 2018-03-05 DIAGNOSIS — M79.89 OTHER SPECIFIED SOFT TISSUE DISORDERS: ICD-10-CM

## 2018-03-05 DIAGNOSIS — M79.672 PAIN IN LEFT FOOT: ICD-10-CM

## 2018-03-05 PROCEDURE — 73630 X-RAY EXAM OF FOOT: CPT | Mod: 26,LT

## 2018-03-05 PROCEDURE — 99214 OFFICE O/P EST MOD 30 MIN: CPT

## 2018-03-15 ENCOUNTER — FORM ENCOUNTER (OUTPATIENT)
Age: 68
End: 2018-03-15

## 2018-03-16 ENCOUNTER — APPOINTMENT (OUTPATIENT)
Dept: MRI IMAGING | Facility: CLINIC | Age: 68
End: 2018-03-16
Payer: MEDICARE

## 2018-03-16 ENCOUNTER — OUTPATIENT (OUTPATIENT)
Dept: OUTPATIENT SERVICES | Facility: HOSPITAL | Age: 68
LOS: 1 days | End: 2018-03-16

## 2018-03-16 PROCEDURE — 73718 MRI LOWER EXTREMITY W/O DYE: CPT | Mod: 26,LT

## 2018-03-16 PROCEDURE — 73721 MRI JNT OF LWR EXTRE W/O DYE: CPT | Mod: 26,LT

## 2018-03-26 ENCOUNTER — APPOINTMENT (OUTPATIENT)
Dept: ORTHOPEDIC SURGERY | Facility: CLINIC | Age: 68
End: 2018-03-26
Payer: MEDICARE

## 2018-03-26 VITALS — HEIGHT: 67 IN | WEIGHT: 142 LBS | BODY MASS INDEX: 22.29 KG/M2

## 2018-03-26 DIAGNOSIS — M84.375A STRESS FRACTURE, LEFT FOOT, INITIAL ENCOUNTER FOR FRACTURE: ICD-10-CM

## 2018-03-26 PROCEDURE — 99214 OFFICE O/P EST MOD 30 MIN: CPT

## 2018-03-27 PROBLEM — M84.375A STRESS FRACTURE OF LEFT FOOT, INITIAL ENCOUNTER: Status: ACTIVE | Noted: 2018-03-27

## 2018-04-09 ENCOUNTER — APPOINTMENT (OUTPATIENT)
Dept: ORTHOPEDIC SURGERY | Facility: CLINIC | Age: 68
End: 2018-04-09
Payer: MEDICARE

## 2018-04-09 VITALS — BODY MASS INDEX: 22.29 KG/M2 | RESPIRATION RATE: 16 BRPM | WEIGHT: 142 LBS | HEIGHT: 67 IN

## 2018-04-09 PROCEDURE — 20610 DRAIN/INJ JOINT/BURSA W/O US: CPT | Mod: RT

## 2018-04-16 ENCOUNTER — APPOINTMENT (OUTPATIENT)
Dept: ORTHOPEDIC SURGERY | Facility: CLINIC | Age: 68
End: 2018-04-16
Payer: MEDICARE

## 2018-04-16 VITALS — BODY MASS INDEX: 22.29 KG/M2 | HEIGHT: 67 IN | RESPIRATION RATE: 16 BRPM | WEIGHT: 142 LBS

## 2018-04-16 PROCEDURE — 20610 DRAIN/INJ JOINT/BURSA W/O US: CPT | Mod: RT

## 2018-04-23 ENCOUNTER — APPOINTMENT (OUTPATIENT)
Dept: ORTHOPEDIC SURGERY | Facility: CLINIC | Age: 68
End: 2018-04-23
Payer: MEDICARE

## 2018-04-23 PROCEDURE — 20610 DRAIN/INJ JOINT/BURSA W/O US: CPT | Mod: RT

## 2018-06-03 ENCOUNTER — FORM ENCOUNTER (OUTPATIENT)
Age: 68
End: 2018-06-03

## 2018-06-04 ENCOUNTER — APPOINTMENT (OUTPATIENT)
Dept: ORTHOPEDIC SURGERY | Facility: CLINIC | Age: 68
End: 2018-06-04
Payer: MEDICARE

## 2018-06-04 ENCOUNTER — OUTPATIENT (OUTPATIENT)
Dept: OUTPATIENT SERVICES | Facility: HOSPITAL | Age: 68
LOS: 1 days | End: 2018-06-04
Payer: MEDICARE

## 2018-06-04 ENCOUNTER — APPOINTMENT (OUTPATIENT)
Dept: RADIOLOGY | Facility: CLINIC | Age: 68
End: 2018-06-04
Payer: MEDICARE

## 2018-06-04 VITALS — BODY MASS INDEX: 22.29 KG/M2 | HEIGHT: 67 IN | WEIGHT: 142 LBS | RESPIRATION RATE: 16 BRPM

## 2018-06-04 DIAGNOSIS — M25.511 PAIN IN RIGHT SHOULDER: ICD-10-CM

## 2018-06-04 PROCEDURE — 73030 X-RAY EXAM OF SHOULDER: CPT | Mod: 26,RT

## 2018-06-04 PROCEDURE — 73030 X-RAY EXAM OF SHOULDER: CPT

## 2018-06-04 PROCEDURE — 99214 OFFICE O/P EST MOD 30 MIN: CPT

## 2018-06-05 ENCOUNTER — APPOINTMENT (OUTPATIENT)
Dept: MRI IMAGING | Facility: CLINIC | Age: 68
End: 2018-06-05

## 2018-06-08 PROCEDURE — 99202 OFFICE O/P NEW SF 15 MIN: CPT

## 2018-06-11 ENCOUNTER — FORM ENCOUNTER (OUTPATIENT)
Age: 68
End: 2018-06-11

## 2018-06-12 ENCOUNTER — OUTPATIENT (OUTPATIENT)
Dept: OUTPATIENT SERVICES | Facility: HOSPITAL | Age: 68
LOS: 1 days | End: 2018-06-12

## 2018-06-12 ENCOUNTER — APPOINTMENT (OUTPATIENT)
Dept: MRI IMAGING | Facility: CLINIC | Age: 68
End: 2018-06-12
Payer: MEDICARE

## 2018-06-12 PROCEDURE — 73221 MRI JOINT UPR EXTREM W/O DYE: CPT | Mod: 26,RT

## 2018-06-13 PROCEDURE — 99212 OFFICE O/P EST SF 10 MIN: CPT

## 2018-06-18 ENCOUNTER — APPOINTMENT (OUTPATIENT)
Dept: ORTHOPEDIC SURGERY | Facility: CLINIC | Age: 68
End: 2018-06-18
Payer: MEDICARE

## 2018-06-18 VITALS — BODY MASS INDEX: 22.29 KG/M2 | RESPIRATION RATE: 16 BRPM | WEIGHT: 142 LBS | HEIGHT: 67 IN

## 2018-06-18 PROCEDURE — 99214 OFFICE O/P EST MOD 30 MIN: CPT

## 2018-07-18 ENCOUNTER — APPOINTMENT (OUTPATIENT)
Dept: ORTHOPEDIC SURGERY | Facility: CLINIC | Age: 68
End: 2018-07-18
Payer: MEDICARE

## 2018-07-18 VITALS — HEIGHT: 67 IN | BODY MASS INDEX: 22.29 KG/M2 | RESPIRATION RATE: 16 BRPM | WEIGHT: 142 LBS

## 2018-07-18 PROCEDURE — 20610 DRAIN/INJ JOINT/BURSA W/O US: CPT | Mod: RT

## 2018-07-22 PROBLEM — I83.893 VARICOSE VEINS OF BILATERAL LOWER EXTREMITIES WITH OTHER COMPLICATIONS: Status: RESOLVED | Noted: 2018-01-10 | Resolved: 2018-07-22

## 2018-08-22 PROBLEM — N40.0 BENIGN PROSTATIC HYPERPLASIA WITHOUT LOWER URINARY TRACT SYMPTOMS: Chronic | Status: ACTIVE | Noted: 2017-08-05

## 2018-09-13 ENCOUNTER — APPOINTMENT (OUTPATIENT)
Dept: INFECTIOUS DISEASE | Facility: CLINIC | Age: 68
End: 2018-09-13
Payer: MEDICARE

## 2018-09-13 VITALS
TEMPERATURE: 98.5 F | SYSTOLIC BLOOD PRESSURE: 137 MMHG | WEIGHT: 143 LBS | HEIGHT: 66 IN | HEART RATE: 83 BPM | DIASTOLIC BLOOD PRESSURE: 79 MMHG | BODY MASS INDEX: 22.98 KG/M2 | OXYGEN SATURATION: 99 % | RESPIRATION RATE: 15 BRPM
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
## 2018-09-13 DIAGNOSIS — R51 HEADACHE: ICD-10-CM

## 2018-09-13 PROCEDURE — 99204 OFFICE O/P NEW MOD 45 MIN: CPT

## 2018-09-13 RX ORDER — TRAMADOL HYDROCHLORIDE 50 MG/1
50 TABLET, COATED ORAL
Qty: 30 | Refills: 0 | Status: COMPLETED | COMMUNITY
Start: 2018-01-02 | End: 2018-09-13

## 2018-09-13 RX ORDER — PREDNISOLONE ACETATE 10 MG/ML
1 SUSPENSION/ DROPS OPHTHALMIC
Qty: 5 | Refills: 0 | Status: COMPLETED | COMMUNITY
Start: 2017-07-28 | End: 2018-09-13

## 2018-09-13 RX ORDER — TRIAMCINOLONE ACETONIDE 1 MG/G
0.1 CREAM TOPICAL
Qty: 30 | Refills: 0 | Status: COMPLETED | COMMUNITY
Start: 2017-07-14 | End: 2018-09-13

## 2018-09-13 RX ORDER — CHROMIUM 200 MCG
TABLET ORAL
Refills: 0 | Status: ACTIVE | COMMUNITY

## 2018-09-13 RX ORDER — CIPROFLOXACIN HYDROCHLORIDE 500 MG/1
500 TABLET, FILM COATED ORAL
Qty: 4 | Refills: 0 | Status: COMPLETED | COMMUNITY
Start: 2017-09-14 | End: 2018-09-13

## 2018-09-13 RX ORDER — DICLOFENAC POTASSIUM 50 MG/1
50 TABLET, COATED ORAL
Qty: 20 | Refills: 0 | Status: COMPLETED | COMMUNITY
Start: 2018-01-22 | End: 2018-09-13

## 2018-09-13 RX ORDER — AMLODIPINE BESYLATE 5 MG/1
5 TABLET ORAL
Qty: 30 | Refills: 0 | Status: COMPLETED | COMMUNITY
Start: 2018-01-10 | End: 2018-09-13

## 2018-09-17 ENCOUNTER — APPOINTMENT (OUTPATIENT)
Dept: ORTHOPEDIC SURGERY | Facility: CLINIC | Age: 68
End: 2018-09-17
Payer: MEDICARE

## 2018-09-17 VITALS — BODY MASS INDEX: 22.98 KG/M2 | WEIGHT: 143 LBS | HEIGHT: 66 IN | RESPIRATION RATE: 16 BRPM

## 2018-09-17 PROCEDURE — 20610 DRAIN/INJ JOINT/BURSA W/O US: CPT | Mod: RT

## 2018-09-18 LAB
A PHAGOCYTOPH IGG TITR SER IF: NORMAL TITER
B BURGDOR AB SER QL IA: NEGATIVE
B MICROTI IGG TITR SER: NORMAL TITER
E CHAFFEENSIS IGG TITR SER IF: NORMAL TITER

## 2018-09-19 LAB

## 2018-10-11 ENCOUNTER — TRANSCRIPTION ENCOUNTER (OUTPATIENT)
Age: 68
End: 2018-10-11

## 2018-10-17 ENCOUNTER — APPOINTMENT (OUTPATIENT)
Dept: ORTHOPEDIC SURGERY | Facility: CLINIC | Age: 68
End: 2018-10-17
Payer: MEDICARE

## 2018-10-17 VITALS — RESPIRATION RATE: 16 BRPM | WEIGHT: 143 LBS | BODY MASS INDEX: 22.98 KG/M2 | HEIGHT: 66 IN

## 2018-10-17 PROCEDURE — 20610 DRAIN/INJ JOINT/BURSA W/O US: CPT | Mod: RT

## 2018-11-05 ENCOUNTER — TRANSCRIPTION ENCOUNTER (OUTPATIENT)
Age: 68
End: 2018-11-05

## 2018-11-30 ENCOUNTER — APPOINTMENT (OUTPATIENT)
Dept: ORTHOPEDIC SURGERY | Facility: CLINIC | Age: 68
End: 2018-11-30
Payer: MEDICARE

## 2018-11-30 VITALS — WEIGHT: 143 LBS | BODY MASS INDEX: 22.98 KG/M2 | HEIGHT: 66 IN

## 2018-11-30 PROCEDURE — 20610 DRAIN/INJ JOINT/BURSA W/O US: CPT | Mod: LT

## 2019-01-25 ENCOUNTER — APPOINTMENT (OUTPATIENT)
Dept: ORTHOPEDIC SURGERY | Facility: CLINIC | Age: 69
End: 2019-01-25
Payer: MEDICARE

## 2019-01-25 VITALS — HEIGHT: 66 IN | WEIGHT: 143 LBS | RESPIRATION RATE: 16 BRPM | BODY MASS INDEX: 22.98 KG/M2

## 2019-01-25 PROCEDURE — 20610 DRAIN/INJ JOINT/BURSA W/O US: CPT | Mod: RT

## 2019-02-11 ENCOUNTER — TRANSCRIPTION ENCOUNTER (OUTPATIENT)
Age: 69
End: 2019-02-11

## 2019-03-22 ENCOUNTER — APPOINTMENT (OUTPATIENT)
Dept: ORTHOPEDIC SURGERY | Facility: CLINIC | Age: 69
End: 2019-03-22
Payer: MEDICARE

## 2019-03-22 VITALS — HEIGHT: 66 IN | WEIGHT: 143 LBS | BODY MASS INDEX: 22.98 KG/M2 | RESPIRATION RATE: 16 BRPM

## 2019-03-22 DIAGNOSIS — M75.81 OTHER SHOULDER LESIONS, RIGHT SHOULDER: ICD-10-CM

## 2019-03-22 DIAGNOSIS — S46.811A STRAIN OF OTHER MUSCLES, FASCIA AND TENDONS AT SHOULDER AND UPPER ARM LEVEL, RIGHT ARM, INITIAL ENCOUNTER: ICD-10-CM

## 2019-03-22 DIAGNOSIS — M75.51 BURSITIS OF RIGHT SHOULDER: ICD-10-CM

## 2019-03-22 PROCEDURE — 20611 DRAIN/INJ JOINT/BURSA W/US: CPT | Mod: LT

## 2019-03-22 PROCEDURE — 99214 OFFICE O/P EST MOD 30 MIN: CPT | Mod: 25

## 2019-03-22 NOTE — DISCUSSION/SUMMARY
[de-identified] : She was counseled regarding evaluation. With rest left knee osteoarthritis he continues to gain benefit from corticosteroid injections. We will repeat a left knee cortisone injection today per his request. He responded well prior to putting the clinic. He can return in a month for the right knee injection if desired.\par \par With regards to right shoulder pain, but physical exam is consistent with rotator cuff syndrome. He is no acute changes and strength to suggest progression of this tear and as such return to physical therapy and an appropriate option. No further imaging is required at this time. The patient just to improve with therapy with recurrent pain next up would be repeat MRI.\par \par Followup as needed

## 2019-03-22 NOTE — REASON FOR VISIT
[FreeTextEntry2] : juan jose shoulder cuff tendinosis/tear/impingement followup; left knee osteoarthritis followup

## 2019-03-22 NOTE — HISTORY OF PRESENT ILLNESS
[de-identified] : 68-year-old male with no known history of bilateral knee osteoarthritis presents complaining of recurrent bilateral knee pain. Last injection was in November 2018, last right knee injection was in January 2019. She would like injections of both the proximal and understands it may not be time for the right side. Reports achy pain it is mostly when walking up or down stairs or with prolonged standing. Also has some achy pain in the evening.  Continues and anti-inflammatories p.r.n.\par \par Patient was also previously seen for his right shoulder in June of 2018. 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 He recommended physical therapy and had some improvement in symptoms. Today he reports over the last 1-2 months his symptoms have recurred. This is mostly in the presence of achy pain at night and with overhead activities. He would like to return to physical therapy as well as anti-inflammatories alone have not resolved his symptoms. He denies any new or progressive weakness and complains of primarily is related to pain

## 2019-03-22 NOTE — PROCEDURE
[de-identified] : Procedure: Kenolog injection of the left knee joint  (prior injection 11/30/19)\par Indication:  Inflammation and Joint pain. \par Risk, benefits and alternatives were discussed with the patient. Potential complications include bleeding and infection. \par Alcohol was used to prep the area.  Ethyl chloride spray was used as a topical anesthetic.  Using sterile technique, the aspiration/injection needle was then directed from a lateral and superior aspect.  The procedure was ultrasound guided.  A 1.5 inch 21g needle was used to inject 3 mL of 1% Lidocaine, 3 mL 0.25% Bupivacaine and 2 mL 40mg/mL triamcinolone.  A bandage was applied.  The patient tolerated the procedure well. \par Complications: None. \par Patient instructed to avoid strenuous activity for 2 day(s). \par Follow-up in the office as needed, in 3 months for repeat injection, if pain remains unresolved, or for further concerns.  Specifically counseled regarding the signs and symptoms of potential intraarticular infection and instructed to present promptly to clinic or hospital if such signs and symptoms arise.\par

## 2019-04-23 ENCOUNTER — FORM ENCOUNTER (OUTPATIENT)
Age: 69
End: 2019-04-23

## 2019-04-24 ENCOUNTER — OUTPATIENT (OUTPATIENT)
Dept: OUTPATIENT SERVICES | Facility: HOSPITAL | Age: 69
LOS: 1 days | End: 2019-04-24
Payer: MEDICARE

## 2019-04-24 ENCOUNTER — APPOINTMENT (OUTPATIENT)
Dept: RADIOLOGY | Facility: CLINIC | Age: 69
End: 2019-04-24

## 2019-04-24 ENCOUNTER — APPOINTMENT (OUTPATIENT)
Age: 69
End: 2019-04-24
Payer: MEDICARE

## 2019-04-24 VITALS — RESPIRATION RATE: 16 BRPM | HEIGHT: 66 IN | BODY MASS INDEX: 22.98 KG/M2 | WEIGHT: 143 LBS

## 2019-04-24 DIAGNOSIS — M47.22 OTHER SPONDYLOSIS WITH RADICULOPATHY, CERVICAL REGION: ICD-10-CM

## 2019-04-24 DIAGNOSIS — M54.12 RADICULOPATHY, CERVICAL REGION: ICD-10-CM

## 2019-04-24 PROCEDURE — 20611 DRAIN/INJ JOINT/BURSA W/US: CPT | Mod: RT

## 2019-04-24 PROCEDURE — 99214 OFFICE O/P EST MOD 30 MIN: CPT | Mod: 25

## 2019-04-24 PROCEDURE — 72050 X-RAY EXAM NECK SPINE 4/5VWS: CPT | Mod: 26

## 2019-04-24 PROCEDURE — 72050 X-RAY EXAM NECK SPINE 4/5VWS: CPT

## 2019-05-29 ENCOUNTER — APPOINTMENT (OUTPATIENT)
Dept: PHYSICAL MEDICINE AND REHAB | Facility: CLINIC | Age: 69
End: 2019-05-29
Payer: MEDICARE

## 2019-05-29 VITALS
HEART RATE: 71 BPM | SYSTOLIC BLOOD PRESSURE: 112 MMHG | OXYGEN SATURATION: 98 % | BODY MASS INDEX: 22.98 KG/M2 | WEIGHT: 143 LBS | DIASTOLIC BLOOD PRESSURE: 80 MMHG | HEIGHT: 66 IN

## 2019-05-29 DIAGNOSIS — M47.812 SPONDYLOSIS W/OUT MYELOPATHY OR RADICULOPATHY, CERVICAL REGION: ICD-10-CM

## 2019-05-29 PROCEDURE — 99203 OFFICE O/P NEW LOW 30 MIN: CPT

## 2019-05-31 PROBLEM — M47.812 SPONDYLOSIS OF CERVICAL REGION WITHOUT MYELOPATHY OR RADICULOPATHY: Status: ACTIVE | Noted: 2019-05-31

## 2019-05-31 NOTE — HISTORY OF PRESENT ILLNESS
[FreeTextEntry1] : NOEL Freeman is a very pleasant 68 year male who comes in for evaluation of pain on his neck and arm that has been ongoing for two months  without any specific injury or inciting event. The pain is located primarily in the bilateral biceps and neck without any radiating symptoms to the extremities intermittent in nature and described as stiff/achy. The pain is rated as 3/10 during today's visit, and ranges from 3-9/10. The patient's symptoms are aggravated by lifting weights, working out  and alleviated by CBD. He reports having bilateral RTC tendinopathy and has been doing PT for both shoulders. The patient denies any night pain, numbness/tingling, weakness, or bowel/bladder dysfunction. The patient has no other complaints at this time.

## 2019-05-31 NOTE — PHYSICAL EXAM
[FreeTextEntry1] : GEN: AAOx3, NAD.\par PSYCH: Normal mood and affect. Responds appropriately to commands.\par EYES: Sclerae Anicteric. No discharge. EOMI.\par RESP: Breathing unlabored.\par CV: Radial pulses 2+ and equal. No varicosities noted.\par EXT: No C/C/E.\par SKIN: No lesions noted.\par LYMPH: No supraclavicular, anterior or posterior cervical lymphadenopathy appreciated.\par GAIT: Non antalgic, Normal reciprocating heel to toe.\par STRENGTH: 5/5 bilateral shoulder abductors, elbow flexors, elbow extensors, wrist extensors, hand intrinsics, long finger flexors to D3 and D5.\par TONE: Normal, No clonus.\par REFLEXES: Symmetric biceps, triceps, brachioradialis, pronator teres. Hobson's negative bilaterally.\par SENSATION: Grossly intact to light touch bilateral upper extremities.\par INSP: Spine alignment is midline, with no evidence of scoliosis.\par CERVICAL ROM: Flexion, extension, side-bending, rotation, oblique extension all limited with axial symptoms. \par SHOULDER ROM: Full and pain free bilaterally.\par PALP: (+) TTP B-Prox Biceps Tendon. There is no tenderness over the midline spinous processes, paravertebral muscles, trapezius, levator scapulae or shoulder region bilaterally.\par SPECIAL: Spurling's maneuver negative bilaterally. Axial Compression negative. Lhermitte's sign negative.

## 2019-05-31 NOTE — ASSESSMENT
[FreeTextEntry1] : Impression:\par 1. Cervical Spondylosis\par 2. Bilateral Biceps Tendinopathy\par \par Plan: After review of the history, physical examination, and imaging, the patient's symptoms are consistent with \par Cervical Spondylosis and Bilateral Biceps Tendinopathy. There does not seem to be a cervical radiculopathy. The imaging results and diagnosis were discussed in detail with the patient. We discussed all the potential treatment options including physical therapy, oral medication, interventional spine procedures, and surgery; as well as alternative therapeutics such as acupuncture and massage. We also discussed the importance of weight loss, ergonomics, and posture in the long term management of the condition. At this time I am recommending that the patient undergo a course of PT. The patient will return to the office for followup in 6-8 weeks. Depending on his response we may consider MRI CSpine. The patient expressed verbal understanding and is in agreement with the plan of care. All of the patient's questions and concerns were addressed during today's visit.

## 2019-05-31 NOTE — REVIEW OF SYSTEMS
[Patient Intake Form Reviewed] : Patient intake form was reviewed [FreeTextEntry1] : Constitutional: no chills, not feeling tired and no fever. \par Eyes: no discharge, no pain and no redness. \par ENT: no nasal discharge, no nosebleeds and no sore throat. \par Cardiovascular: no chest pain, no palpitations and the heart rate was not fast. \par Respiratory: no shortness of breath, no cough and no wheezing. \par Gastrointestinal: no abdominal pain, no diarrhea, no vomiting and no melena. \par Genitourinary: no pelvic pain, no dysuria, no abnormal urethral discharge and no frequency. \par Integumentary: no skin lesions and no change in a mole. \par Neurological: no dizziness, no fainting and no convulsions. \par Endocrine: no deepening of the voice and no hot flashes. \par Hematologic/Lymphatic: does not bleed easily, no swollen glands and no cervical lymphadenopathy. \par Musculoskeletal: as per HPI, otherwise denies additional joint pain, effusions, stiffness.\par All other review of systems are negative except as noted.

## 2019-07-01 ENCOUNTER — APPOINTMENT (OUTPATIENT)
Dept: ORTHOPEDIC SURGERY | Facility: CLINIC | Age: 69
End: 2019-07-01
Payer: MEDICARE

## 2019-07-01 VITALS — RESPIRATION RATE: 16 BRPM | HEIGHT: 66 IN | WEIGHT: 143 LBS | BODY MASS INDEX: 22.98 KG/M2

## 2019-07-01 PROCEDURE — 20610 DRAIN/INJ JOINT/BURSA W/O US: CPT | Mod: LT

## 2019-07-01 RX ORDER — MULTIVITAMIN
TABLET ORAL
Refills: 0 | Status: ACTIVE | COMMUNITY

## 2019-07-16 ENCOUNTER — TRANSCRIPTION ENCOUNTER (OUTPATIENT)
Age: 69
End: 2019-07-16

## 2019-08-26 ENCOUNTER — APPOINTMENT (OUTPATIENT)
Dept: ORTHOPEDIC SURGERY | Facility: CLINIC | Age: 69
End: 2019-08-26

## 2019-10-14 ENCOUNTER — TRANSCRIPTION ENCOUNTER (OUTPATIENT)
Age: 69
End: 2019-10-14

## 2019-10-14 ENCOUNTER — APPOINTMENT (OUTPATIENT)
Dept: ORTHOPEDIC SURGERY | Facility: CLINIC | Age: 69
End: 2019-10-14

## 2019-10-14 VITALS — BODY MASS INDEX: 22.98 KG/M2 | WEIGHT: 143 LBS | RESPIRATION RATE: 16 BRPM | HEIGHT: 66 IN

## 2019-10-20 ENCOUNTER — TRANSCRIPTION ENCOUNTER (OUTPATIENT)
Age: 69
End: 2019-10-20

## 2019-11-04 ENCOUNTER — APPOINTMENT (OUTPATIENT)
Dept: ORTHOPEDIC SURGERY | Facility: CLINIC | Age: 69
End: 2019-11-04

## 2019-11-11 ENCOUNTER — APPOINTMENT (OUTPATIENT)
Dept: ORTHOPEDIC SURGERY | Facility: CLINIC | Age: 69
End: 2019-11-11
Payer: MEDICARE

## 2019-11-11 ENCOUNTER — APPOINTMENT (OUTPATIENT)
Dept: ORTHOPEDIC SURGERY | Facility: CLINIC | Age: 69
End: 2019-11-11

## 2019-11-11 PROCEDURE — 20610 DRAIN/INJ JOINT/BURSA W/O US: CPT | Mod: RT

## 2019-11-12 ENCOUNTER — TRANSCRIPTION ENCOUNTER (OUTPATIENT)
Age: 69
End: 2019-11-12

## 2019-12-04 ENCOUNTER — APPOINTMENT (OUTPATIENT)
Dept: ORTHOPEDIC SURGERY | Facility: CLINIC | Age: 69
End: 2019-12-04
Payer: MEDICARE

## 2019-12-04 PROCEDURE — 20610 DRAIN/INJ JOINT/BURSA W/O US: CPT | Mod: LT

## 2019-12-04 NOTE — PROCEDURE
[de-identified] : 68-year-old male with known history of bilateral knee osteoarthritis presents complaining of recurrent pain in left knee. Last treatment on the left knee with a steroid injection on July 1st, 2019. Reports 2.5 months of excellent relief of pain. Pain has recurred over last few weeks. Pain is located in same location he complained of pain previously, it is mainly at the lateral joint line and patellofemoral joints at times. No significant mechanical symptoms. No instability. Patient will like a repeat left knee injection today.\par \par Patient also mentions today that he is concerned that his right knee injection done on 11/11/19 did not work as well as previous injections, but he state it may be due to compensating due to the left knee pain.\par \par Procedure: Kenolog injection of the left knee joint \par Indication: Inflammation and joint pain, osteoarthritis\par Risk, benefits and alternatives were discussed with the patient. Potential complications include bleeding and infection. \par Alcohol was used to prep the area. Ethyl chloride spray was used as a topical anesthetic. Using sterile technique, the aspiration/injection needle was then directed from a lateral and superior aspect. The procedure was not ultrasound guided. A 1.5 inch 21g needle was used to inject 3 mL of 1% Lidocaine, 1mL 0.75% Bupivacaine and 1mL 40mg/mL triamcinolone. A bandage was applied. The patient tolerated the procedure well. \par Complications: None. \par Patient instructed to avoid strenuous activity for 2 day(s). \par Follow-up in the office as needed, in 3 months for repeat injection, if pain remains unresolved, or for further concerns. Specifically counseled regarding the signs and symptoms of potential intraarticular infection and instructed to present promptly to clinic or hospital if such signs and symptoms arise.

## 2019-12-30 ENCOUNTER — TRANSCRIPTION ENCOUNTER (OUTPATIENT)
Age: 69
End: 2019-12-30

## 2020-01-07 ENCOUNTER — TRANSCRIPTION ENCOUNTER (OUTPATIENT)
Age: 70
End: 2020-01-07

## 2020-03-11 ENCOUNTER — TRANSCRIPTION ENCOUNTER (OUTPATIENT)
Age: 70
End: 2020-03-11

## 2020-04-07 ENCOUNTER — TRANSCRIPTION ENCOUNTER (OUTPATIENT)
Age: 70
End: 2020-04-07

## 2020-05-12 ENCOUNTER — TRANSCRIPTION ENCOUNTER (OUTPATIENT)
Age: 70
End: 2020-05-12

## 2020-06-01 ENCOUNTER — APPOINTMENT (OUTPATIENT)
Dept: ORTHOPEDIC SURGERY | Facility: CLINIC | Age: 70
End: 2020-06-01
Payer: MEDICARE

## 2020-06-01 VITALS
WEIGHT: 143 LBS | DIASTOLIC BLOOD PRESSURE: 91 MMHG | BODY MASS INDEX: 22.98 KG/M2 | HEIGHT: 66 IN | SYSTOLIC BLOOD PRESSURE: 166 MMHG

## 2020-06-01 PROCEDURE — 99213 OFFICE O/P EST LOW 20 MIN: CPT | Mod: 25

## 2020-06-01 PROCEDURE — 20610 DRAIN/INJ JOINT/BURSA W/O US: CPT | Mod: 50

## 2020-06-01 NOTE — PROCEDURE
[de-identified] : 70 yo male with known bilateral knee osteoarthritis presents for bilateral knee injections.  last injection > 3 months ago bilaterally.  Pain is moderate, constant, worse after walking 1 block.\par \par Bilateral knee evaluation demonstrates near full ROM, less than 5 degree flexion contracture, TTP medial and lateral jt line, + patellar grind/compression, + effusion, 5/5 strength\par \par Procedure: Kenalog injection of the Right Knee joint \par Indication:  Inflammation and Joint pain. \par Risk, benefits and alternatives were discussed with the patient. Potential complications include bleeding and infection. \par Alcohol was used to prep the area.  Ethyl chloride spray was used as a topical anesthetic.  Using sterile technique, the aspiration/injection needle was then directed from a lateral and superior aspect.  The procedure was ultrasound guided.  A 1.5 inch 21g needle was used to inject 3 mL of 1% Lidocaine, 1 mL 0.75% Bupivacaine and 1 mL 40mg/mL triamcinolone.  A bandage was applied.  The patient tolerated the procedure well. \par Complications: None. \par Patient instructed to avoid strenuous activity for 2 day(s). \par Follow-up in the office as needed, in 3 months for repeat injection, if pain remains unresolved, or for further concerns.  Specifically counseled regarding the signs and symptoms of potential intraarticular infection and instructed to present promptly to clinic or hospital if such signs and symptoms arise.\par \par Procedure: Kenalog injection of the Left Knee joint \par Indication:  Inflammation and Joint pain. \par Risk, benefits and alternatives were discussed with the patient. Potential complications include bleeding and infection. \par Alcohol was used to prep the area.  Ethyl chloride spray was used as a topical anesthetic.  Using sterile technique, the aspiration/injection needle was then directed from a lateral and superior aspect.  The procedure was ultrasound guided.  A 1.5 inch 21g needle was used to inject 3 mL of 1% Lidocaine, 1 mL 0.75% Bupivacaine and 1 mL 40mg/mL triamcinolone.  A bandage was applied.  The patient tolerated the procedure well. \par Complications: None. \par Patient instructed to avoid strenuous activity for 2 day(s). \par Follow-up in the office as needed, in 3 months for repeat injection, if pain remains unresolved, or for further concerns.  Specifically counseled regarding the signs and symptoms of potential intraarticular infection and instructed to present promptly to clinic or hospital if such signs and symptoms arise.\par \par Pt tolerated both injections well with no complications.\par \par Return in 3 months as needed for repeat injections.\par

## 2020-11-22 ENCOUNTER — TRANSCRIPTION ENCOUNTER (OUTPATIENT)
Age: 70
End: 2020-11-22

## 2021-02-26 ENCOUNTER — OUTPATIENT (OUTPATIENT)
Dept: OUTPATIENT SERVICES | Facility: HOSPITAL | Age: 71
LOS: 1 days | End: 2021-02-26
Payer: MEDICARE

## 2021-02-26 ENCOUNTER — APPOINTMENT (OUTPATIENT)
Dept: ORTHOPEDIC SURGERY | Facility: CLINIC | Age: 71
End: 2021-02-26
Payer: MEDICARE

## 2021-02-26 ENCOUNTER — RESULT REVIEW (OUTPATIENT)
Age: 71
End: 2021-02-26

## 2021-02-26 VITALS
TEMPERATURE: 97.3 F | DIASTOLIC BLOOD PRESSURE: 80 MMHG | BODY MASS INDEX: 22.98 KG/M2 | WEIGHT: 143 LBS | HEIGHT: 66 IN | OXYGEN SATURATION: 98 % | HEART RATE: 80 BPM | SYSTOLIC BLOOD PRESSURE: 130 MMHG

## 2021-02-26 DIAGNOSIS — S40.011A CONTUSION OF RIGHT SHOULDER, INITIAL ENCOUNTER: ICD-10-CM

## 2021-02-26 DIAGNOSIS — S43.51XA SPRAIN OF RIGHT ACROMIOCLAVICULAR JOINT, INITIAL ENCOUNTER: ICD-10-CM

## 2021-02-26 DIAGNOSIS — S43.421A SPRAIN OF RIGHT ROTATOR CUFF CAPSULE, INITIAL ENCOUNTER: ICD-10-CM

## 2021-02-26 PROCEDURE — 99213 OFFICE O/P EST LOW 20 MIN: CPT

## 2021-02-26 PROCEDURE — 73030 X-RAY EXAM OF SHOULDER: CPT | Mod: 26,RT

## 2021-02-26 PROCEDURE — 73030 X-RAY EXAM OF SHOULDER: CPT

## 2021-02-27 NOTE — HISTORY OF PRESENT ILLNESS
[de-identified] : The patient is a 70 year old, RHD man presenting with right shoulder pain after minor fall.\par \par The patient presents with acute right shoulder pain s/p fall on the point of his shoulder after a minor fall off his couch while sleeping.  He denies neck pain or LOC.  He has maintained nearly full ROM with some pain.  He has a history of right RTC tendinopathy, previously seen by Dr. Conde and treated conservatively with PT.  The patient denies distal numbness, weakness, paresthesias.  The patient denies significant swelling or bruising.\par \par Pain is rated 5/10, described as achy, improved with rest, worse with overhead movements. [5] : a current pain level of 5/10

## 2021-02-27 NOTE — PHYSICAL EXAM
[de-identified] : General: Well-nourished, well-developed, alert, and in no acute distress.\par Head: Normocephalic.\par Eyes: Pupils equal round reactive to light and accommodation, extraocular muscles intact, normal sclera.\par Nose: No nasal discharge.\par Cardiac: Regular rate. Extremities are warm and well perfused. Distal pulses are symmetric bilaterally.\par Respiratory: No labored breathing.\par Extremities: Sensation is intact distally bilaterally.  Distal pulses are symmetric bilaterally\par Lymphatic: No regional lymphadenopathy, no lymphedema\par Neurologic: No focal deficits\par Skin: Normal skin color, texture, and turgor\par Psychiatric: Normal affect\par MSK: as noted above/below\par \par \par \par RIGHT SHOULDER:\par  \par Inspection:no bruising, rash, erythema, deformity\par Joint Effusion:no\par ROM:MILDLY APPREHENSIVE, BUT FULL ROM WITH Forward Flexion, Abduction , Internal Rotation: , External Rotation \par Palpation: PAIN AT AC JOINT,NO PAIN AT Bicipital Groove Pain , GH joint pain , Clavicle pain , GT pain \par Distal Pulses:normal\par Upper Extremity Reflexes:2+\par Shoulder Strength: 5/5 \par Upper Extremity Sensation: normal\par \par Special Tests:\par Empty Can: Negative \par Lift-Off Test: WITH MILD PAIN\par Cross Arm: POSITIVE\par Neer: POSITIVE\par Llanos: POSITIVE\par Speed: Negative\par \par LEFT SHOULDER:\par  \par Inspection:no bruising, rash, erythema, deformity\par Joint Effusion:no\par ROM:normal Forward Flexion, Abduction , Internal Rotation: , External Rotation \par Palpation: NO AC joint pain, Bicipital Groove Pain , GH joint pain , Clavicle pain , GT pain \par Distal Pulses:normal\par Upper Extremity Reflexes:2+\par Shoulder Strength: 5/5 \par Upper Extremity Sensation: normal\par \par Special Tests:\par Empty Can: Negative \par Lift-Off Test: Negative \par Cross Arm: Negative\par Neer: Negative\par Llanos: Negative\par Speed: Negative\par \par  [de-identified] : Xray RIGHT Shoulder - Multiple views were reviewed with the patient in detail.  \par \par Internal rotation, external rotation, scapular Y view and axillary views of the right shoulder are compared 6/2018 and demonstrates slight narrowing of the glenohumeral joint space. No fracture. No dislocation. Visualized lung is clear.\par \par \par \par

## 2021-02-27 NOTE — DISCUSSION/SUMMARY
[Medication Risks Reviewed] : Medication risks reviewed [de-identified] : The patient is a 70 year old, RHD man with history of RTC tendinopathy presenting with one day history of right shoulder pain after minor fall.  His pain is likely secondary to mild AC joint sprain/RTC sprain and shoulder contusion.\par \par Imaging/Diagnostics/Medical Records were interpreted and/or reviewed and results were reviewed with the patient in detail.  All questions were answered appropriately.\par \par The patient was counseled on the natural progression of the problem(s) today and potential complications of diagnoses.  The patient was provided reassurance today.\par \par The patient was counseled on Rest-Ice-Compression-Elevation (RICE).\par \par We discussed short course of protection.  Sling only as needed for comfort.  Avoid shoulder abduction above 90 degrees until pain improves, ~1-2 weeks.  As pain starts to improve, start full ROM exercises.\par \par The patient was also provided some general home exercises.  The patient was counseled on activity modification.\par \par Follow-up in 2 weeks with Dr. Conde if symptoms persist or worsen.\par \par ------------------------------------------------------------------------------------------------------------------\par Patient appreciates and agrees with current plan.\par \par The patient's diagnosis above was evaluated by me, personally.  Diagnostic Testing and treatment options were discussed with the patient in detail.  The risks/benefits/potential complications of diagnostic testing/treatments were described in detail.  \par \par This note was generated using a mixture of manual typing and dragon medical dictation software.  A reasonable effort has been made for proofreading its contents, but typos may still remain.  If there are any questions or points of clarification needed please notify my office.\par \par \par >30 minutes of time was spent on total encounter.  >50% of the visit was spent on face-to-face counseling/coordination of care and medical-decision making for this patient.\par

## 2021-03-01 ENCOUNTER — APPOINTMENT (OUTPATIENT)
Dept: ORTHOPEDIC SURGERY | Facility: CLINIC | Age: 71
End: 2021-03-01

## 2021-03-10 ENCOUNTER — APPOINTMENT (OUTPATIENT)
Dept: HEMATOLOGY ONCOLOGY | Facility: CLINIC | Age: 71
End: 2021-03-10
Payer: MEDICARE

## 2021-03-10 VITALS
WEIGHT: 145 LBS | SYSTOLIC BLOOD PRESSURE: 168 MMHG | BODY MASS INDEX: 23.3 KG/M2 | TEMPERATURE: 97.2 F | DIASTOLIC BLOOD PRESSURE: 89 MMHG | OXYGEN SATURATION: 99 % | HEIGHT: 66 IN | HEART RATE: 81 BPM

## 2021-03-10 VITALS — DIASTOLIC BLOOD PRESSURE: 104 MMHG | SYSTOLIC BLOOD PRESSURE: 172 MMHG

## 2021-03-10 DIAGNOSIS — D72.819 DECREASED WHITE BLOOD CELL COUNT, UNSPECIFIED: ICD-10-CM

## 2021-03-10 PROCEDURE — 99204 OFFICE O/P NEW MOD 45 MIN: CPT

## 2021-03-10 RX ORDER — MULTIVITAMIN
TABLET ORAL
Refills: 0 | Status: ACTIVE | COMMUNITY

## 2021-03-10 RX ORDER — CLOTRIMAZOLE 10 MG/G
1 CREAM TOPICAL
Qty: 30 | Refills: 0 | Status: COMPLETED | COMMUNITY
Start: 2017-07-14 | End: 2021-03-10

## 2021-03-10 RX ORDER — HYALURONATE SODIUM 30 MG/2 ML
30 SYRINGE (ML) INTRAARTICULAR WEEKLY
Qty: 3 | Refills: 0 | Status: COMPLETED | COMMUNITY
Start: 2018-03-30 | End: 2021-03-10

## 2021-03-10 NOTE — HISTORY OF PRESENT ILLNESS
[de-identified] : 70 years old white male self-referred himself because of dropping blood counts... He is taking multiple OTC meds see the list below...

## 2021-06-30 ENCOUNTER — APPOINTMENT (OUTPATIENT)
Dept: VASCULAR SURGERY | Facility: CLINIC | Age: 71
End: 2021-06-30
Payer: MEDICARE

## 2021-06-30 VITALS — HEART RATE: 68 BPM | DIASTOLIC BLOOD PRESSURE: 88 MMHG | SYSTOLIC BLOOD PRESSURE: 149 MMHG | OXYGEN SATURATION: 99 %

## 2021-06-30 DIAGNOSIS — M79.662 PAIN IN LEFT LOWER LEG: ICD-10-CM

## 2021-06-30 PROCEDURE — 99203 OFFICE O/P NEW LOW 30 MIN: CPT | Mod: 25

## 2021-06-30 PROCEDURE — 93970 EXTREMITY STUDY: CPT

## 2021-06-30 NOTE — REASON FOR VISIT
[Initial Evaluation] : an initial evaluation [FreeTextEntry1] : pt here with 3 week c/o left calf cramp and ache. No hx trauma, pt does walk a lot. Has hx prostate ca treatment and episode urosepsis which required hospitalization and IV abx - pt still receiving IV abx at home thru PICC line.

## 2021-06-30 NOTE — PHYSICAL EXAM
[JVD] : no jugular venous distention  [2+] : left 2+ [Ankle Swelling (On Exam)] : not present [Ankle Swelling Bilaterally] : bilaterally  [Varicose Veins Of Lower Extremities] : bilaterally [] : bilaterally [No Rash or Lesion] : No rash or lesion [Purpura] : no purpura  [Petechiae] : no petechiae [Skin Ulcer] : no ulcer [Skin Induration] : no induration [Alert] : alert [Oriented to Person] : oriented to person [Oriented to Place] : oriented to place [Oriented to Time] : oriented to time [Calm] : calm [de-identified] : wdwn nad [de-identified] : wnl [de-identified] : elverl [de-identified] : wnl [FreeTextEntry1] : no varicose veins no homin's sign no mass [de-identified] : wnl

## 2021-07-15 ENCOUNTER — TRANSCRIPTION ENCOUNTER (OUTPATIENT)
Age: 71
End: 2021-07-15

## 2021-09-07 ENCOUNTER — TRANSCRIPTION ENCOUNTER (OUTPATIENT)
Age: 71
End: 2021-09-07

## 2021-10-14 ENCOUNTER — TRANSCRIPTION ENCOUNTER (OUTPATIENT)
Age: 71
End: 2021-10-14

## 2021-10-21 NOTE — ED ADULT NURSE NOTE - TEMPLATE LIST FOR HEAD TO TOE ASSESSMENT
Dr. Herrera,       Lissette Coker ( [facility])     [phone]    Martha Campos RN    
The encounter is closed thus complete.   
General

## 2021-10-22 ENCOUNTER — TRANSCRIPTION ENCOUNTER (OUTPATIENT)
Age: 71
End: 2021-10-22

## 2021-11-23 ENCOUNTER — TRANSCRIPTION ENCOUNTER (OUTPATIENT)
Age: 71
End: 2021-11-23

## 2021-12-12 ENCOUNTER — TRANSCRIPTION ENCOUNTER (OUTPATIENT)
Age: 71
End: 2021-12-12

## 2022-04-21 ENCOUNTER — TRANSCRIPTION ENCOUNTER (OUTPATIENT)
Age: 72
End: 2022-04-21

## 2022-04-21 ENCOUNTER — EMERGENCY (EMERGENCY)
Facility: HOSPITAL | Age: 72
LOS: 1 days | Discharge: HOME | End: 2022-04-21
Attending: EMERGENCY MEDICINE | Admitting: EMERGENCY MEDICINE
Payer: MEDICARE

## 2022-04-21 VITALS
RESPIRATION RATE: 18 BRPM | OXYGEN SATURATION: 98 % | WEIGHT: 145.06 LBS | HEART RATE: 79 BPM | SYSTOLIC BLOOD PRESSURE: 147 MMHG | DIASTOLIC BLOOD PRESSURE: 98 MMHG | TEMPERATURE: 98 F | HEIGHT: 67 IN

## 2022-04-21 DIAGNOSIS — R10.30 LOWER ABDOMINAL PAIN, UNSPECIFIED: ICD-10-CM

## 2022-04-21 DIAGNOSIS — C61 MALIGNANT NEOPLASM OF PROSTATE: ICD-10-CM

## 2022-04-21 DIAGNOSIS — K92.1 MELENA: ICD-10-CM

## 2022-04-21 LAB
ALBUMIN SERPL ELPH-MCNC: 4.2 G/DL — SIGNIFICANT CHANGE UP (ref 3.3–5)
ALP SERPL-CCNC: 56 U/L — SIGNIFICANT CHANGE UP (ref 40–120)
ALT FLD-CCNC: 19 U/L — SIGNIFICANT CHANGE UP (ref 10–45)
ANION GAP SERPL CALC-SCNC: 11 MMOL/L — SIGNIFICANT CHANGE UP (ref 5–17)
APPEARANCE UR: CLEAR — SIGNIFICANT CHANGE UP
APTT BLD: 32.2 SEC — SIGNIFICANT CHANGE UP (ref 27.5–35.5)
AST SERPL-CCNC: 24 U/L — SIGNIFICANT CHANGE UP (ref 10–40)
BASOPHILS # BLD AUTO: 0.02 K/UL — SIGNIFICANT CHANGE UP (ref 0–0.2)
BASOPHILS NFR BLD AUTO: 0.5 % — SIGNIFICANT CHANGE UP (ref 0–2)
BILIRUB SERPL-MCNC: 0.9 MG/DL — SIGNIFICANT CHANGE UP (ref 0.2–1.2)
BILIRUB UR-MCNC: NEGATIVE — SIGNIFICANT CHANGE UP
BLD GP AB SCN SERPL QL: NEGATIVE — SIGNIFICANT CHANGE UP
BUN SERPL-MCNC: 16 MG/DL — SIGNIFICANT CHANGE UP (ref 7–23)
CALCIUM SERPL-MCNC: 9.1 MG/DL — SIGNIFICANT CHANGE UP (ref 8.4–10.5)
CHLORIDE SERPL-SCNC: 103 MMOL/L — SIGNIFICANT CHANGE UP (ref 96–108)
CO2 SERPL-SCNC: 24 MMOL/L — SIGNIFICANT CHANGE UP (ref 22–31)
COLOR SPEC: YELLOW — SIGNIFICANT CHANGE UP
CREAT SERPL-MCNC: 0.88 MG/DL — SIGNIFICANT CHANGE UP (ref 0.5–1.3)
DIFF PNL FLD: NEGATIVE — SIGNIFICANT CHANGE UP
EGFR: 91 ML/MIN/1.73M2 — SIGNIFICANT CHANGE UP
EOSINOPHIL # BLD AUTO: 0.02 K/UL — SIGNIFICANT CHANGE UP (ref 0–0.5)
EOSINOPHIL NFR BLD AUTO: 0.5 % — SIGNIFICANT CHANGE UP (ref 0–6)
GLUCOSE SERPL-MCNC: 106 MG/DL — HIGH (ref 70–99)
GLUCOSE UR QL: NEGATIVE — SIGNIFICANT CHANGE UP
HCT VFR BLD CALC: 40.4 % — SIGNIFICANT CHANGE UP (ref 39–50)
HGB BLD-MCNC: 13.9 G/DL — SIGNIFICANT CHANGE UP (ref 13–17)
IMM GRANULOCYTES NFR BLD AUTO: 0.5 % — SIGNIFICANT CHANGE UP (ref 0–1.5)
INR BLD: 1.18 — HIGH (ref 0.88–1.16)
KETONES UR-MCNC: 15 MG/DL
LEUKOCYTE ESTERASE UR-ACNC: NEGATIVE — SIGNIFICANT CHANGE UP
LYMPHOCYTES # BLD AUTO: 0.87 K/UL — LOW (ref 1–3.3)
LYMPHOCYTES # BLD AUTO: 23 % — SIGNIFICANT CHANGE UP (ref 13–44)
MCHC RBC-ENTMCNC: 30.5 PG — SIGNIFICANT CHANGE UP (ref 27–34)
MCHC RBC-ENTMCNC: 34.4 GM/DL — SIGNIFICANT CHANGE UP (ref 32–36)
MCV RBC AUTO: 88.8 FL — SIGNIFICANT CHANGE UP (ref 80–100)
MONOCYTES # BLD AUTO: 0.5 K/UL — SIGNIFICANT CHANGE UP (ref 0–0.9)
MONOCYTES NFR BLD AUTO: 13.2 % — SIGNIFICANT CHANGE UP (ref 2–14)
NEUTROPHILS # BLD AUTO: 2.35 K/UL — SIGNIFICANT CHANGE UP (ref 1.8–7.4)
NEUTROPHILS NFR BLD AUTO: 62.3 % — SIGNIFICANT CHANGE UP (ref 43–77)
NITRITE UR-MCNC: NEGATIVE — SIGNIFICANT CHANGE UP
NRBC # BLD: 0 /100 WBCS — SIGNIFICANT CHANGE UP (ref 0–0)
OB PNL STL: NEGATIVE — SIGNIFICANT CHANGE UP
PH UR: 6 — SIGNIFICANT CHANGE UP (ref 5–8)
PLATELET # BLD AUTO: 131 K/UL — LOW (ref 150–400)
POTASSIUM SERPL-MCNC: 4 MMOL/L — SIGNIFICANT CHANGE UP (ref 3.5–5.3)
POTASSIUM SERPL-SCNC: 4 MMOL/L — SIGNIFICANT CHANGE UP (ref 3.5–5.3)
PROT SERPL-MCNC: 6.3 G/DL — SIGNIFICANT CHANGE UP (ref 6–8.3)
PROT UR-MCNC: NEGATIVE MG/DL — SIGNIFICANT CHANGE UP
PROTHROM AB SERPL-ACNC: 14.1 SEC — HIGH (ref 10.5–13.4)
RBC # BLD: 4.55 M/UL — SIGNIFICANT CHANGE UP (ref 4.2–5.8)
RBC # FLD: 12.5 % — SIGNIFICANT CHANGE UP (ref 10.3–14.5)
RH IG SCN BLD-IMP: POSITIVE — SIGNIFICANT CHANGE UP
SARS-COV-2 RNA SPEC QL NAA+PROBE: NEGATIVE — SIGNIFICANT CHANGE UP
SODIUM SERPL-SCNC: 138 MMOL/L — SIGNIFICANT CHANGE UP (ref 135–145)
SP GR SPEC: 1.02 — SIGNIFICANT CHANGE UP (ref 1–1.03)
UROBILINOGEN FLD QL: 0.2 E.U./DL — SIGNIFICANT CHANGE UP
WBC # BLD: 3.78 K/UL — LOW (ref 3.8–10.5)
WBC # FLD AUTO: 3.78 K/UL — LOW (ref 3.8–10.5)

## 2022-04-21 PROCEDURE — G1004: CPT

## 2022-04-21 PROCEDURE — 85025 COMPLETE CBC W/AUTO DIFF WBC: CPT

## 2022-04-21 PROCEDURE — 99285 EMERGENCY DEPT VISIT HI MDM: CPT

## 2022-04-21 PROCEDURE — 85610 PROTHROMBIN TIME: CPT

## 2022-04-21 PROCEDURE — 86900 BLOOD TYPING SEROLOGIC ABO: CPT

## 2022-04-21 PROCEDURE — 86850 RBC ANTIBODY SCREEN: CPT

## 2022-04-21 PROCEDURE — 87635 SARS-COV-2 COVID-19 AMP PRB: CPT

## 2022-04-21 PROCEDURE — 82272 OCCULT BLD FECES 1-3 TESTS: CPT

## 2022-04-21 PROCEDURE — 81003 URINALYSIS AUTO W/O SCOPE: CPT

## 2022-04-21 PROCEDURE — 74177 CT ABD & PELVIS W/CONTRAST: CPT | Mod: MG

## 2022-04-21 PROCEDURE — 99285 EMERGENCY DEPT VISIT HI MDM: CPT | Mod: 25

## 2022-04-21 PROCEDURE — 80053 COMPREHEN METABOLIC PANEL: CPT

## 2022-04-21 PROCEDURE — 36415 COLL VENOUS BLD VENIPUNCTURE: CPT

## 2022-04-21 PROCEDURE — 93005 ELECTROCARDIOGRAM TRACING: CPT | Mod: XU

## 2022-04-21 PROCEDURE — 86901 BLOOD TYPING SEROLOGIC RH(D): CPT

## 2022-04-21 PROCEDURE — 83735 ASSAY OF MAGNESIUM: CPT

## 2022-04-21 PROCEDURE — 83690 ASSAY OF LIPASE: CPT

## 2022-04-21 PROCEDURE — 74177 CT ABD & PELVIS W/CONTRAST: CPT | Mod: 26,MG

## 2022-04-21 PROCEDURE — 85730 THROMBOPLASTIN TIME PARTIAL: CPT

## 2022-04-21 RX ORDER — IOHEXOL 300 MG/ML
30 INJECTION, SOLUTION INTRAVENOUS ONCE
Refills: 0 | Status: COMPLETED | OUTPATIENT
Start: 2022-04-21 | End: 2022-04-21

## 2022-04-21 RX ORDER — ONDANSETRON 8 MG/1
4 TABLET, FILM COATED ORAL ONCE
Refills: 0 | Status: COMPLETED | OUTPATIENT
Start: 2022-04-21 | End: 2022-04-21

## 2022-04-21 RX ORDER — FAMOTIDINE 10 MG/ML
20 INJECTION INTRAVENOUS ONCE
Refills: 0 | Status: COMPLETED | OUTPATIENT
Start: 2022-04-21 | End: 2022-04-21

## 2022-04-21 RX ORDER — SODIUM CHLORIDE 9 MG/ML
1000 INJECTION INTRAMUSCULAR; INTRAVENOUS; SUBCUTANEOUS ONCE
Refills: 0 | Status: COMPLETED | OUTPATIENT
Start: 2022-04-21 | End: 2022-04-21

## 2022-04-21 RX ADMIN — SODIUM CHLORIDE 1000 MILLILITER(S): 9 INJECTION INTRAMUSCULAR; INTRAVENOUS; SUBCUTANEOUS at 20:36

## 2022-04-21 RX ADMIN — IOHEXOL 30 MILLILITER(S): 300 INJECTION, SOLUTION INTRAVENOUS at 20:36

## 2022-04-21 NOTE — ED ADULT TRIAGE NOTE - CHIEF COMPLAINT QUOTE
x 4 weeks of lower abdominal pain. PT states "the pain got worse over the past week." had x 2 episodes of black stool today. no blood thinners.

## 2022-04-21 NOTE — ED ADULT NURSE NOTE - OBJECTIVE STATEMENT
Pt reports lower abd pain x 4 weeks with 2 episodes of black stool today. Denies n/v, diarrhea, fevers.

## 2022-04-22 VITALS
SYSTOLIC BLOOD PRESSURE: 125 MMHG | RESPIRATION RATE: 18 BRPM | DIASTOLIC BLOOD PRESSURE: 70 MMHG | OXYGEN SATURATION: 100 % | TEMPERATURE: 99 F | HEART RATE: 80 BPM

## 2022-04-22 NOTE — ED PROVIDER NOTE - CLINICAL SUMMARY MEDICAL DECISION MAKING FREE TEXT BOX
70 yo male with h/o prostate CA, in the Er c/o diffuse lower abdominal pain for about a month. Pt reports pain is mostly across lower abdomen, suprapubic area  and is much worse in the morning. Pt denies n/v/d/c, denies any appetite or diet changes, denies hematuria or dysuria. Pt concerned because he noted that his stool was black color today. No h/o GI bleed in the past.  labs and CT scan are done. pt remains comfortable . bladder diverticulum and stone within it on the CT. pt has no difficulty urinating, seeking discharge home. pt states he will f/u with his urologist tomorrow.

## 2022-04-22 NOTE — ED PROVIDER NOTE - NS ED ATTENDING STATEMENT MOD
This was a shared visit with the ANDI. I reviewed and verified the documentation and independently performed the documented:

## 2022-04-22 NOTE — ED PROVIDER NOTE - NSFOLLOWUPINSTRUCTIONS_ED_ALL_ED_FT
Please follow up with your urologist and gastroenterologist for re-evaluation and furtger management.          BLADDER DIVERTICULECTOMY - General Information           Bladder Diverticulectomy    WHAT YOU NEED TO KNOW:    What do I need to know about a bladder diverticulectomy? Bladder diverticulectomy is surgery to remove diverticula. These are pouches attached to your bladder by a piece of tissue called a neck. They form when an obstruction (block) stops urine from flowing. Pressure builds up in the bladder and pushes the lining through the bladder wall. Diverticulectomy is often done for several diverticula. It may be done if you have only one (called a diverticulum) that is large or preventing you from urinating.   Kidney, Ureters, Bladder         How do I prepare for surgery?   •You may need several tests before your surgery. A cystoscopy is a procedure to look inside your urethra and bladder with a cystoscope. A cystoscope is a small tube with a light and magnifying camera on the end. A sample of your urine may also be tested for signs of infection. An infection will need to be treated before you have a diverticulectomy. Ultrasound, MRI, or voiding cystourethrography pictures may be used to check the diverticula or your bladder. An obstruction (block) in your urethra found during one of these tests will need to be removed before you have a diverticulectomy.       •Your healthcare provider will tell you how to prepare for surgery. Tell your provider about all the medicines you currently take. aspirin, and ibuprofen several days before your procedure. You will be told which medicines to take or not take on the day of surgery. You may be told not to eat or drink anything after midnight on the day of your surgery.      What will happen during surgery?   •You will be given general anesthesia to keep you asleep and pain-free during surgery. A Khan catheter will be guided into your bladder through your urethra. The catheter is used to empty urine from your bladder during surgery. Later in the surgery, it is used to fill the bladder with liquid. Stents may be placed in your ureters to help protect them from injury during surgery. A stent is a soft tube placed inside the ureter to keep it open. Your surgeon will tell you how long the stents need to stay in place. They may need to stay in for 2 weeks.      •You may have open surgery or laparoscopic surgery. For open surgery, you will have 1 longer incision in your abdomen. Your surgeon will do this surgery only through this incision. Laparoscopic means your surgeon makes small incisions near the diverticula. He or she will guide a laparoscope into one of the incisions. This is a tube with a camera on the end. Your surgeon will guide tools into the other incisions.      •Your surgeon will cut the neck of the diverticulum from the bladder. He or she will then remove the diverticulum. It may be sent to a lab to be tested for cancer. Your surgeon will close up the cut this makes in the bladder. The bladder will be filled with liquid to check for leaks and then drained. The surgery incisions on the outside of your body will be closed and covered with a bandage. A suprapubic catheter may also be left in place after surgery. This is a tube that enters the skin through the lower abdomen and goes into the bladder.      What should I expect after surgery?   •The Khan catheter, and possibly the suprapubic catheter, will still be in place after surgery. These may stay in place for up to a week to help drain urine from your bladder. Your bladder may be irrigated (washed out) continuously for the first day or two after surgery.      •Healthcare providers will help you walk safely for a short time after surgery. When you walk on the same day after surgery, you help prevent blood clots.      What are the risks of bladder diverticulectomy? You may bleed more than expected during surgery. You may also develop an infection. Your ureter or bowel may be injured. You may develop a fistula, urinary tract infection (UTI), or an abscess (pocket of pus). You may also leak urine.    CARE AGREEMENT:    You have the right to help plan your care. Learn about your health condition and how it may be treated. Discuss treatment options with your healthcare providers to decide what care you want to receive. You always have the right to refuse treatment. Please follow up with your urologist and gastroenterologist for re-evaluation and further management.          BLADDER DIVERTICULECTOMY - General Information           Bladder Diverticulectomy    WHAT YOU NEED TO KNOW:    What do I need to know about a bladder diverticulectomy? Bladder diverticulectomy is surgery to remove diverticula. These are pouches attached to your bladder by a piece of tissue called a neck. They form when an obstruction (block) stops urine from flowing. Pressure builds up in the bladder and pushes the lining through the bladder wall. Diverticulectomy is often done for several diverticula. It may be done if you have only one (called a diverticulum) that is large or preventing you from urinating.   Kidney, Ureters, Bladder         How do I prepare for surgery?   •You may need several tests before your surgery. A cystoscopy is a procedure to look inside your urethra and bladder with a cystoscope. A cystoscope is a small tube with a light and magnifying camera on the end. A sample of your urine may also be tested for signs of infection. An infection will need to be treated before you have a diverticulectomy. Ultrasound, MRI, or voiding cystourethrography pictures may be used to check the diverticula or your bladder. An obstruction (block) in your urethra found during one of these tests will need to be removed before you have a diverticulectomy.       •Your healthcare provider will tell you how to prepare for surgery. Tell your provider about all the medicines you currently take. aspirin, and ibuprofen several days before your procedure. You will be told which medicines to take or not take on the day of surgery. You may be told not to eat or drink anything after midnight on the day of your surgery.      What will happen during surgery?   •You will be given general anesthesia to keep you asleep and pain-free during surgery. A Khan catheter will be guided into your bladder through your urethra. The catheter is used to empty urine from your bladder during surgery. Later in the surgery, it is used to fill the bladder with liquid. Stents may be placed in your ureters to help protect them from injury during surgery. A stent is a soft tube placed inside the ureter to keep it open. Your surgeon will tell you how long the stents need to stay in place. They may need to stay in for 2 weeks.      •You may have open surgery or laparoscopic surgery. For open surgery, you will have 1 longer incision in your abdomen. Your surgeon will do this surgery only through this incision. Laparoscopic means your surgeon makes small incisions near the diverticula. He or she will guide a laparoscope into one of the incisions. This is a tube with a camera on the end. Your surgeon will guide tools into the other incisions.      •Your surgeon will cut the neck of the diverticulum from the bladder. He or she will then remove the diverticulum. It may be sent to a lab to be tested for cancer. Your surgeon will close up the cut this makes in the bladder. The bladder will be filled with liquid to check for leaks and then drained. The surgery incisions on the outside of your body will be closed and covered with a bandage. A suprapubic catheter may also be left in place after surgery. This is a tube that enters the skin through the lower abdomen and goes into the bladder.      What should I expect after surgery?   •The Khan catheter, and possibly the suprapubic catheter, will still be in place after surgery. These may stay in place for up to a week to help drain urine from your bladder. Your bladder may be irrigated (washed out) continuously for the first day or two after surgery.      •Healthcare providers will help you walk safely for a short time after surgery. When you walk on the same day after surgery, you help prevent blood clots.      What are the risks of bladder diverticulectomy? You may bleed more than expected during surgery. You may also develop an infection. Your ureter or bowel may be injured. You may develop a fistula, urinary tract infection (UTI), or an abscess (pocket of pus). You may also leak urine.    CARE AGREEMENT:    You have the right to help plan your care. Learn about your health condition and how it may be treated. Discuss treatment options with your healthcare providers to decide what care you want to receive. You always have the right to refuse treatment.

## 2022-04-22 NOTE — ED PROVIDER NOTE - ATTENDING APP SHARED VISIT CONTRIBUTION OF CARE
CONSULT


Date of Admission


DATE: 2/20/18 


TIME: 09:01


Reason for Consult:


elevated troponin


Problem List


Problems


Medical Problems:


(1) COPD exacerbation


Status: Acute  





(2) Elevated troponin


Status: Acute  








History of Present Illness


Mr Ayala is a 62 year old male with history of COPD who was recently seen in the 

hospital for respiratory failure due to influenza, copd exacerbation and 

pneumonia.  He was noted to have an approximately 2 minute run of ventricular 

tachycardia and then developed atrial fibrillation with RVR.  He was 

transferred to [facility] for pulmonary and ID evaluation.  Echocardiogram at that 

time was unremarkable and he had no further ventricular arrhythmias.  On 

[facility], event 

monitoring and possible stress testing.  He reports that he did follow up upon 

which time he underwent a 6 minute walk test, and was given oxygen,  He 

presented back to the ED yesterday with complaints of  again increasing 

shortness of breath.  He reports at least one week history of cough productive 

of yellow sputum, wheezing, and increased shortness of breath at rest and with 

exertion.  He complains of chest tightness intermittently with associated back 

pain.  He believes the chest discomfort is precipitated by the back discomfort. 

He reports it is unrelated to coughing and occurs both with exertion and at 

rest.  He denies fevers or chills, sore throat, nasal congestion, vomiting or 

diarrhea.  He does continue to smoke.


Past Medical History








COPD, bilateral cataract, enlarged prostate, atrial fibrillation, VT





Echo 2/2/18 


The left ventricular systolic function is normal and the ejection fraction is 

within normal range. EF 65%


There is normal LV segmental wall motion.


Past Surgical History





cataract extraction in 1 eye and is scheduled for the other, multiple prostate 

biopsies, nasal surgery, back surgery


Family History





No history of premature coronary disease


Social History


He is , has no children.  He is a heavy smoker, smoked 2-1/2 packs a 

day for almost 45 years, no smoking for 1 week.  Does not drink alcohol or use 

any recreational drugs.  He is retired from the [employer].


Current Medications





Current Medications


Albuterol/ Ipratropium (Duoneb) 3 ml 1X  ONCE NEB  Last administered on 2/19/ 18at 15:45;  Start 2/19/18 at 15:45;  Stop 2/19/18 at 15:46;  Status DC


Albuterol Sulfate (Ventolin) 5 mg 1X  ONCE NEB  Last administered on 2/19/18at 

16:00;  Start 2/19/18 at 15:45;  Stop 2/19/18 at 15:46;  Status DC


Methylprednisolone Sodium Succinate (SOLU-Medrol 125MG VIAL) 125 mg 1X  ONCE IV

  Last administered on 2/19/18at 16:00;  Start 2/19/18 at 15:45;  Stop 2/19/18 

at 15:46;  Status DC


Sodium Chloride 1,000 ml @  1,000 mls/hr 1X  ONCE IV  Last administered on 2/19/ 18at 15:45;  Start 2/19/18 at 15:45;  Stop 2/19/18 at 16:44;  Status DC


Aspirin (Children'S Aspirin) 324 mg 1X  ONCE PO  Last administered on 2/19/18at 

16:00;  Start 2/19/18 at 15:45;  Stop 2/19/18 at 15:46;  Status DC


Ibuprofen (Motrin) 600 mg 1X  ONCE PO ;  Start 2/19/18 at 15:45;  Stop 2/19/18 

at 15:45;  Status DC


Sodium Chloride 1,000 ml @  1,000 mls/hr 1X  ONCE IV ;  Start 2/19/18 at 16:15;

  Stop 2/19/18 at 16:30;  Status DC


Ondansetron HCl (Zofran) 4 mg 1X  ONCE IV ;  Start 2/19/18 at 16:30;  Stop 2/19/ 18 at 16:30;  Status DC


Ondansetron HCl (Zofran) 4 mg PRN Q4HRS  PRN IV NAUSEA/VOMITING;  Start 2/19/18 

at 16:30;  Stop 2/20/18 at 16:29


Morphine Sulfate (Morphine 2mg Syringe) 2 mg PRN Q2HR  PRN IV PAIN;  Start 2/19/ 18 at 16:30;  Stop 2/19/18 at 18:43;  Status DC


Acetaminophen (Tylenol) 650 mg PRN Q4HRS  PRN PO FEVER;  Start 2/19/18 at 16:30

;  Stop 2/20/18 at 16:29


Nitroglycerin (Nitrostat) 0.4 mg PRN Q5MIN  PRN SL CHEST PAIN;  Start 2/19/18 

at 16:30;  Stop 2/20/18 at 16:29


Albuterol/ Ipratropium (Duoneb) 3 ml RTQID NEB ;  Start 2/19/18 at 20:00;  Stop 

2/19/18 at 20:40;  Status DC


Sodium Chloride 2,610 ml @  2,610 mls/hr Q1H ONCE IV  Last administered on 2/19/ 18at 18:34;  Start 2/19/18 at 17:00;  Stop 2/19/18 at 17:59;  Status DC


Ceftriaxone Sodium 1 gm/ Sodium Chloride 50 ml @  100 mls/hr 1X  ONCE IV ;  

Start 2/19/18 at 17:15;  Stop 2/19/18 at 17:44;  Status UNV


Ceftriaxone Sodium (Rocephin) 1 gm 1X  ONCE IVP  Last administered on 2/19/18at 

17:30;  Start 2/19/18 at 17:30;  Stop 2/19/18 at 17:31;  Status DC


Morphine Sulfate (Morphine 4mg Syringe) 2 mg PRN Q2HR  PRN IV PAIN;  Start 2/19/ 18 at 18:43;  Stop 2/20/18 at 16:29


Nicotine (Nicoderm Cq 21mg) 1 patch DAILY TD  Last administered on 2/20/18at 08:

30;  Start 2/19/18 at 21:00


Enoxaparin Sodium (Lovenox) 40 mg Q24H SQ  Last administered on 2/19/18at 21:09

;  Start 2/19/18 at 21:00


Alprazolam (Xanax) 0.25 mg PRN Q4HRS  PRN PO ANXIETY Last administered on 2/20/ 18at 06:22;  Start 2/19/18 at 20:30


Albuterol/ Ipratropium (Duoneb) 3 ml RTQID NEB  Last administered on 2/20/18at 

06:15;  Start 2/19/18 at 21:00


Montelukast Sodium (Singulair) 10 mg HS PO  Last administered on 2/19/18at 21:09

;  Start 2/19/18 at 21:00


Tamsulosin HCl (Flomax) 0.4 mg HS PO  Last administered on 2/19/18at 21:09;  

Start 2/19/18 at 21:00


Oxycodone HCl (OxyCONTIN) 10 mg PRN BID  PRN PO PAIN Last administered on 2/20/ 18at 08:29;  Start 2/19/18 at 21:00





Active Scripts


Active


Reported


Tamsulosin Hcl 0.4 Mg Cap.er.24h 1 Cap PO HS


     LAST DOSE GIVEN:


     DATE:


     TIME:


     NEXT DOSE DUE:


     DATE:


     TIME:


Oxycodone Hcl 10 Mg Tablet 1 Tab PO BID PRN


     LAST DOSE GIVEN:


     DATE:


     TIME:


     NEXT DOSE DUE:


     DATE:


     TIME:


NICODERM CQ 21mg (Nicotine) 1 Each Patch.td24 1 Patch TP DAILY


     LAST DOSE GIVEN:


     DATE:


     TIME:


     NEXT DOSE DUE:


     DATE:


     TIME:


Singulair Tablet (Montelukast Sodium) 10 Mg Tablet 10 Mg PO HS


     LAST DOSE GIVEN:


     DATE:


     TIME:


     NEXT DOSE DUE:


     DATE:


     TIME:


Duoneb 0.5-3(2.5) Mg/3 Ml (Albuterol/Ipratropium) 3 Ml Ampul.neb 3 Ml NEB QID


     LAST DOSE GIVEN:


     DATE:


     TIME:


     NEXT DOSE DUE:


     DATE:


     TIME:


Alprazolam 0.25 Mg Tablet 1 Tab PO Q4HRS PRN


     LAST DOSE GIVEN:


     DATE:


     TIME:


     NEXT DOSE DUE:


     DATE:


     TIME:


Albuterol Sulfate Neb Soln (Albuterol Sulfate) 1.25 Mg/3 Ml Vial.neb 1 Vial NEB 

Q4HRS PRN


     LAST DOSE GIVEN:


     DATE:


     TIME:


     NEXT DOSE DUE:


     DATE:


     TIME:


Allergies:  


Coded Allergies:  


     No Known Drug Allergies (Unverified , 2/2/18)


Review of System


as per HPI


General:  Alert, Oriented X3, Cooperative, mild distress


HEENT:  Atraumatic, EOMI


Lungs:  Other (bilateral expiratory wheezing)


Heart:  Regular rate, Normal S1, Normal S2


Abdomen:  Normal bowel sounds, Soft


Extremities:  No cyanosis, Normal pulses


Neuro:  Normal speech, Strength at 5/5 X4 ext


Psych/Mental Status:  Mental status NL, Mood NL


VITALS





Vital Signs








  Date Time  Temp Pulse Resp B/P (MAP) Pulse Ox O2 Delivery O2 Flow Rate FiO2


 


2/20/18 08:16      Nasal Cannula 3.0 


 


2/20/18 06:40  86 20 106/66 (79) 97   


 


2/20/18 05:35 98.6       








Labs





Laboratory Tests








Test


  2/19/18


15:30 2/19/18


17:33 2/19/18


18:16 2/19/18


21:55


 


White Blood Count


  8.7 x10^3/uL


(4.0-11.0) 


  


  


 


 


Red Blood Count


  4.20 x10^6/uL


(4.30-5.70) 


  


  


 


 


Hemoglobin


  13.4 g/dL


(13.0-17.5) 


  


  


 


 


Hematocrit


  39.7 %


(39.0-53.0) 


  


  


 


 


Mean Corpuscular Volume 95 fL ()    


 


Mean Corpuscular Hemoglobin 32 pg (25-35)    


 


Mean Corpuscular Hemoglobin


Concent 34 g/dL


(31-37) 


  


  


 


 


Red Cell Distribution Width


  13.2 %


(11.5-14.5) 


  


  


 


 


Platelet Count


  230 x10^3/uL


(140-400) 


  


  


 


 


Neutrophils (%) (Auto) 76 % (31-73)    


 


Lymphocytes (%) (Auto) 16 % (24-48)    


 


Monocytes (%) (Auto) 8 % (0-9)    


 


Eosinophils (%) (Auto) 1 % (0-3)    


 


Basophils (%) (Auto) 1 % (0-3)    


 


Neutrophils # (Auto)


  6.6 x10^3uL


(1.8-7.7) 


  


  


 


 


Lymphocytes # (Auto)


  1.4 x10^3/uL


(1.0-4.8) 


  


  


 


 


Monocytes # (Auto)


  0.7 x10^3/uL


(0.0-1.1) 


  


  


 


 


Eosinophils # (Auto)


  0.0 x10^3/uL


(0.0-0.7) 


  


  


 


 


Basophils # (Auto)


  0.0 x10^3/uL


(0.0-0.2) 


  


  


 


 


Prothrombin Time


  10.6 SEC


(9.4-11.4) 


  


  


 


 


Prothromb Time International


Ratio 1.0 (0.9-1.1) 


  


  


  


 


 


Sodium Level


  138 mmol/L


(136-145) 


  


  


 


 


Potassium Level


  4.8 mmol/L


(3.5-5.1) 


  


  


 


 


Chloride Level


  98 mmol/L


() 


  


  


 


 


Carbon Dioxide Level


  37 mmol/L


(21-32) 


  


  


 


 


Anion Gap 3 (6-14)    


 


Blood Urea Nitrogen


  16 mg/dL


(8-26) 


  


  


 


 


Creatinine


  1.0 mg/dL


(0.7-1.3) 


  


  


 


 


Estimated GFR


(Cockcroft-Gault) 75.2 


  


  


  


 


 


BUN/Creatinine Ratio 16 (6-20)    


 


Glucose Level


  140 mg/dL


(70-99) 


  


  


 


 


Calcium Level


  8.6 mg/dL


(8.5-10.1) 


  


  


 


 


Total Bilirubin


  0.5 mg/dL


(0.2-1.0) 


  


  


 


 


Aspartate Amino Transf


(AST/SGOT) 18 U/L (15-37) 


  


  


  


 


 


Alanine Aminotransferase


(ALT/SGPT) 29 U/L (16-63) 


  


  


  


 


 


Alkaline Phosphatase


  68 U/L


() 


  


  


 


 


Troponin I Quantitative


  0.226 ng/mL


(0-0.055) 


  


  0.128 ng/mL


(0-0.055)


 


NT-Pro-B-Type Natriuretic


Peptide 2621 pg/mL


(0-124) 


  


  


 


 


Total Protein


  6.9 g/dL


(6.4-8.2) 


  


  


 


 


Albumin


  2.9 g/dL


(3.4-5.0) 


  


  


 


 


Albumin/Globulin Ratio 0.7 (1.0-1.7)    


 


Lactic Acid Level


  


  1.6 mmol/L


(0.4-2.0) 


  


 


 


Influenza Type A (Rapid)


  


  


  Negative


(NEGATIVE) 


 


 


Influenza Type B (Rapid)


  


  


  Negative


(NEGATIVE) 


 


 


Test


  2/20/18


03:40 


  


  


 


 


White Blood Count


  5.5 x10^3/uL


(4.0-11.0) 


  


  


 


 


Red Blood Count


  3.84 x10^6/uL


(4.30-5.70) 


  


  


 


 


Hemoglobin


  12.4 g/dL


(13.0-17.5) 


  


  


 


 


Hematocrit


  36.9 %


(39.0-53.0) 


  


  


 


 


Mean Corpuscular Volume 96 fL ()    


 


Mean Corpuscular Hemoglobin 32 pg (25-35)    


 


Mean Corpuscular Hemoglobin


Concent 34 g/dL


(31-37) 


  


  


 


 


Red Cell Distribution Width


  13.1 %


(11.5-14.5) 


  


  


 


 


Platelet Count


  186 x10^3/uL


(140-400) 


  


  


 


 


Neutrophils (%) (Auto) 93 % (31-73)    


 


Lymphocytes (%) (Auto) 5 % (24-48)    


 


Monocytes (%) (Auto) 2 % (0-9)    


 


Eosinophils (%) (Auto) 0 % (0-3)    


 


Basophils (%) (Auto) 0 % (0-3)    


 


Neutrophils # (Auto)


  5.1 x10^3uL


(1.8-7.7) 


  


  


 


 


Lymphocytes # (Auto)


  0.3 x10^3/uL


(1.0-4.8) 


  


  


 


 


Monocytes # (Auto)


  0.1 x10^3/uL


(0.0-1.1) 


  


  


 


 


Eosinophils # (Auto)


  0.0 x10^3/uL


(0.0-0.7) 


  


  


 


 


Basophils # (Auto)


  0.0 x10^3/uL


(0.0-0.2) 


  


  


 


 


Sodium Level


  140 mmol/L


(136-145) 


  


  


 


 


Potassium Level


  4.7 mmol/L


(3.5-5.1) 


  


  


 


 


Chloride Level


  102 mmol/L


() 


  


  


 


 


Carbon Dioxide Level


  35 mmol/L


(21-32) 


  


  


 


 


Anion Gap 3 (6-14)    


 


Blood Urea Nitrogen


  15 mg/dL


(8-26) 


  


  


 


 


Creatinine


  0.9 mg/dL


(0.7-1.3) 


  


  


 


 


Estimated GFR


(Cockcroft-Gault) 85.0 


  


  


  


 


 


Glucose Level


  225 mg/dL


(70-99) 


  


  


 


 


Calcium Level


  8.4 mg/dL


(8.5-10.1) 


  


  


 


 


Magnesium Level


  2.2 mg/dL


(1.8-2.4) 


  


  


 


 


Troponin I Quantitative


  0.081 ng/mL


(0-0.055) 


  


  


 








Images


CXR - Impression: No acute radiographic abnormality is seen.  COPD.


Assessment/Plan


1.  troponin elevation - mild, likely demand related.  Echo WNL this month.  In 

light of episode of ventricular tachycardia that occurred during last admission 

and complaints of chest pain, suggest cardiac cath when he can comfortably lie 

flat.  Plan to transfer tomorrow or Thursday for cath if he is agreeable. 


2.  Ventricular tachycardia - no new observed


3.  atrial fibrillation with RVR - remains SR


4.  COPD exacerbation - mgmt per PCP


5.  tobaccoism - cessation encouraged


Problems:  











MASSIMO QUINTERO Feb 20, 2018 09:13 Pt seen and evaluated with PA. Agree with plan.

## 2022-04-22 NOTE — ED PROVIDER NOTE - OBJECTIVE STATEMENT
70 yo male with h/o prostate CA, in the Er c/o diffuse lower abdominal pain for about a month. Pt reports pain is mostly across lower abdomen, suprapubic area  and is much worse in the morning. Pt denies n/v/d/c, denies any appetite or diet changes, denies hematuria or dysuria. Pt concerned because he noted that his stool was black color today. No h/o GI bleed in the past.

## 2022-09-07 ENCOUNTER — EMERGENCY (EMERGENCY)
Facility: HOSPITAL | Age: 72
LOS: 1 days | Discharge: HOME | End: 2022-09-07
Attending: STUDENT IN AN ORGANIZED HEALTH CARE EDUCATION/TRAINING PROGRAM | Admitting: STUDENT IN AN ORGANIZED HEALTH CARE EDUCATION/TRAINING PROGRAM
Payer: MEDICARE

## 2022-09-07 VITALS
WEIGHT: 141.98 LBS | TEMPERATURE: 98 F | OXYGEN SATURATION: 96 % | DIASTOLIC BLOOD PRESSURE: 82 MMHG | HEART RATE: 78 BPM | HEIGHT: 67 IN | RESPIRATION RATE: 17 BRPM | SYSTOLIC BLOOD PRESSURE: 150 MMHG

## 2022-09-07 PROCEDURE — 99281 EMR DPT VST MAYX REQ PHY/QHP: CPT

## 2022-09-07 PROCEDURE — L9995: CPT

## 2022-09-07 NOTE — ED PROVIDER NOTE - OBJECTIVE STATEMENT
73 yo male with a hx of BPH presenting to the ED for suture removal. The patient's window accidentally broke and the glass caused a scalp laceration 9 days ago. Went to [facility] where he had a negative head CT and had 12 sutures placed in the ED. Pt came here for removal. Denies fevers or discharge from the wound. Not on blood thinners. No vomiting, visual disturbance, gait problem, syncope, dizziness.

## 2022-09-07 NOTE — ED PROVIDER NOTE - CLINICAL SUMMARY MEDICAL DECISION MAKING FREE TEXT BOX
71 yo male with a hx of BPH presenting to the ED for suture removal from scalp placed at another hospital 9 days ago. Dry blood initially- cleaned with diluted peroxide. 2cm of lac still gaping will need 2-3 more days of sutures. Informed patient- prefers to wait. Instructed to come back for suture removal.

## 2022-09-07 NOTE — ED PROVIDER NOTE - NSFOLLOWUPINSTRUCTIONS_ED_ALL_ED_FT
[facility]ic[facility]CanaH[facility] CenterogTraditional ChineseVietnamese                                                                                                                        Concussion, Adult    A series of images showing how the quick head movements of a concussion injure the brain.   A concussion is a brain injury from a hard, direct hit (trauma) to the head or body. This direct hit causes the brain to shake quickly back and forth inside the skull. This can damage brain cells and cause chemical changes in the brain. A concussion may also be known as a mild traumatic brain injury (TBI).    Concussions are usually not life-threatening, but the effects of a concussion can be serious. If you have a concussion, you should be very careful to avoid having a second concussion.      What are the causes?    This condition is caused by:•A direct hit to your head, such as:  •Running into another player during a game.      •Being hit in a fight.      •Hitting your head on a hard surface.        •Sudden movement of your body that causes your brain to move back and forth inside the skull, such as in a car crash.        What are the signs or symptoms?    The signs of a concussion can be hard to notice. Early on, they may be missed by you, family members, and health care providers. You may look fine on the outside but may act or feel differently.    Every head injury is different. Symptoms are usually temporary but may last for days, weeks, or even months. Some symptoms appear right away, but other symptoms may not show up for hours or days. If your symptoms last longer than normal, you may have post-concussion syndrome.    Physical symptoms     •Headaches.      •Dizziness and problems with coordination or balance.      •Sensitivity to light or noise.      •Nausea or vomiting.      •Tiredness (fatigue).      •Vision or hearing problems.      •Changes in eating or sleeping patterns.      •Seizure.      Mental and emotional symptoms     •Irritability or mood changes.      •Memory problems.      •Trouble concentrating, organizing, or making decisions.      •Slowness in thinking, acting or reacting, speaking, or reading.      •Anxiety or depression.        How is this diagnosed?    This condition is diagnosed based on:  •Your symptoms.      •A description of your injury.      You may also have tests, including:  •Imaging tests, such as a CT scan or an MRI.      •Neuropsychological tests. These measure your thinking, understanding, learning, and remembering abilities.        How is this treated?    Treatment for this condition includes:•Stopping sports or activity if you are injured. If you hit your head or show signs of concussion:   •Do not return to sports or activities the same day.       •Get checked by a health care provider before you return to your activities.        •Physical and mental rest and careful observation, usually at home. Gradually return to your normal activities.    •Medicines to help with symptoms such as headaches, nausea, or difficulty sleeping.  •Avoid taking opioid pain medicine while recovering from a concussion.        •Avoiding alcohol and drugs. These may slow your recovery and can put you at risk of further injury.      •Referral to a concussion clinic or rehabilitation center.      Recovery from a concussion can take time. How fast you recover depends on many factors. Return to activities only when:  •Your symptoms are completely gone.      •Your health care provider says that it is safe.        Follow these instructions at home:    Activity   •Limit activities that require a lot of thought or concentration, such as:  •Doing homework or job-related work.      •Watching TV.      •Working on the computer or phone.      •Playing memory games and puzzles.      •Rest. Rest helps your brain heal. Make sure you:  •Get plenty of sleep. Most adults should get 7–9 hours of sleep each night.      •Rest during the day. Take naps or rest breaks when you feel tired.        •Avoid physical activity like exercise until your health care provider says it is safe. Stop any activity that worsens symptoms.      • Do not do high-risk activities that could cause a second concussion, such as riding a bike or playing sports.      •Ask your health care provider when you can return to your normal activities, such as school, work, athletics, and driving. Your ability to react may be slower after a brain injury. Never do these activities if you are dizzy. Your health care provider will likely give you a plan for gradually returning to activities.        General instructions   A bottle of beer, a glass of wine, and a glass of hard liquor with a "do not drink" sign over them.    •Take over-the-counter and prescription medicines only as told by your health care provider. Some medicines, such as blood thinners (anticoagulants) and aspirin, may increase the risk for complications, such as bleeding.      • Do not drink alcohol until your health care provider says you can.      •Watch your symptoms and tell others around you to do the same. Complications sometimes occur after a concussion. Older adults with a brain injury may have a higher risk of serious complications.      •Tell your , teachers, school nurse, school counselor, , or  about your injury, symptoms, and restrictions.      •Keep all follow-up visits as told by your health care provider. This is important.        How is this prevented?    Avoiding another brain injury is very important. In rare cases, another injury can lead to permanent brain damage, brain swelling, or death. The risk of this is greatest during the first 7–10 days after a head injury. Avoid injuries by:  •Stopping activities that could lead to a second concussion, such as contact or recreational sports, until your health care provider says it is okay.    •Taking these actions once you have returned to sports or activities:  •Avoiding plays or moves that can cause you to crash into another person. This is how most concussions occur.      •Following the rules and being respectful of other players. Do not engage in violent or illegal plays.        •Getting regular exercise that includes strength and balance training.      •Wearing a properly fitting helmet during sports, biking, or other activities. Helmets can help protect you from serious skull and brain injuries, but they may not protect you from a concussion. Even when wearing a helmet, you should avoid being hit in the head.        Contact a health care provider if:    •Your symptoms do not improve.      •You have new symptoms.      •You have another injury.        Get help right away if:  •You have new or worsening physical symptoms, such as:  •A severe or worsening headache.      •Weakness or numbness in any part of your body, slurred speech, vision changes, or confusion.      •Your coordination gets worse.      •Vomiting repeatedly.      •You have a seizure.      •You have unusual behavior changes.      •You lose consciousness, are sleepier than normal, or are difficult to wake up.        These symptoms may represent a serious problem that is an emergency. Do not wait to see if the symptoms will go away. Get medical help right away. Call your local emergency services (911 in the [place] Do not drive yourself to the hospital.       Summary    •A concussion is a brain injury that results from a hard, direct hit (trauma) to your head or body.      •You may have imaging tests and neuropsychological tests to diagnose a concussion.      •Treatment for this condition includes physical and mental rest and careful observation.      •Ask your health care provider when you can return to your normal activities, such as school, work, athletics, and driving.       •Get help right away if you have a severe headache, weakness in any part of the body, seizures, behavior changes, changes in vision, or if you are confused or sleepier than normal.      This information is not intended to replace advice given to you by your health care provider. Make sure you discuss any questions you have with your health care provider.      Document Revised: 03/03/2022 Document Reviewed: 03/03/2022    Elsevier Patient Education © 2022 [employer]

## 2022-09-07 NOTE — ED ADULT NURSE NOTE - CHIEF COMPLAINT QUOTE
pt arriving for suture removal to top of head, s/p laceration 8/29/22. denies pain, n/v/d, fever, chills

## 2022-09-07 NOTE — ED PROVIDER NOTE - NSICDXFAMILYHX_GEN_ALL_CORE_FT
FAMILY HISTORY:  Family history of infectious and parasitic disease, Family history of pneumonia  Family history of other digestive disorders, Family history of cirrhosis of liver

## 2022-09-07 NOTE — ED PROVIDER NOTE - PATIENT PORTAL LINK FT
You can access the FollowMyHealth Patient Portal offered by [facility] by registering at the following website: [email or web address]. By joining [facility]’s FollowMyHealth portal, you will also be able to view your health information using other applications (apps) compatible with our system.

## 2022-09-07 NOTE — ED ADULT NURSE NOTE - OBJECTIVE STATEMENT
Pt returning to ED to have sutures on head removed. Denies drainage, chills, fever. NO headaches, n/v, dizziness, weakness, slurred speech, changes in vision, ambulating with steady gait.

## 2022-09-07 NOTE — ED PROVIDER NOTE - PHYSICAL EXAMINATION
VITAL SIGNS: I have reviewed nursing notes and confirm.  CONSTITUTIONAL: Well appearing, in no acute distress.   SKIN:  warm and dry, no acute rash.   HEAD:  semicircular laceration to scalp with 12 sutures- 2cm of the lac still healing with underlying hematoma   EYES: EOM intact; conjunctiva and sclera clear.  ENT: No nasal discharge; airway clear.   NECK: Supple; non tender.  CARD: S1, S2 normal; no murmurs, gallops, or rubs. Regular rate and rhythm.   RESP:  Clear to auscultation b/l, no wheezes, rales or rhonchi.  ABD: Normal bowel sounds; soft; non-distended; non-tender; no guarding/ rebound.  EXT: Normal ROM. No clubbing, cyanosis or edema. 2+ pulses to b/l ue/le.  NEURO: Alert, oriented, grossly unremarkable  PSYCH: Cooperative, mood and affect appropriate.

## 2022-09-09 ENCOUNTER — EMERGENCY (EMERGENCY)
Facility: HOSPITAL | Age: 72
LOS: 1 days | Discharge: HOME | End: 2022-09-09
Attending: EMERGENCY MEDICINE | Admitting: EMERGENCY MEDICINE
Payer: MEDICARE

## 2022-09-09 VITALS
RESPIRATION RATE: 16 BRPM | WEIGHT: 143.08 LBS | HEART RATE: 67 BPM | OXYGEN SATURATION: 99 % | DIASTOLIC BLOOD PRESSURE: 92 MMHG | HEIGHT: 67 IN | TEMPERATURE: 98 F | SYSTOLIC BLOOD PRESSURE: 135 MMHG
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
## 2022-09-09 DIAGNOSIS — Z48.02 ENCOUNTER FOR REMOVAL OF SUTURES: ICD-10-CM

## 2022-09-09 DIAGNOSIS — S01.01XD LACERATION WITHOUT FOREIGN BODY OF SCALP, SUBSEQUENT ENCOUNTER: ICD-10-CM

## 2022-09-09 PROCEDURE — 99212 OFFICE O/P EST SF 10 MIN: CPT

## 2022-09-09 PROCEDURE — L9995: CPT

## 2022-09-09 NOTE — ED ADULT NURSE REASSESSMENT NOTE - NS ED NURSE REASSESS COMMENT FT1
MD Removed suture.  pt denied any complain ( bleeding, pain, infection).  Pt is in the chair comfortably at this time. Will continue to monitor and document any changes.

## 2022-09-09 NOTE — ED ADULT NURSE NOTE - OBJECTIVE STATEMENT
.  72years male alert mental state (AOX3) received on foot.  -complain of staple removal.  pt was applied on head staple 12 days ago. pt present for staple removal.  -denied fever, chest pain, SOB.  Pt is in the chair comfortably at this time. Will continue to monitor and document any changes.

## 2022-09-09 NOTE — ED ADULT NURSE NOTE - CHIEF COMPLAINT QUOTE
Pt reports he is here for suture removal. Pt states he was seen in HED 2 days for removal and was told to return 2 days later by ED provider. Pt had 12 sutures placed to head 12 days ago.

## 2022-09-09 NOTE — ED PROVIDER NOTE - OBJECTIVE STATEMENT
72 M scalp lac sutured 12 d ago now return for suture removal.  no redness, pain, drainage or fever.

## 2022-09-09 NOTE — ED PROVIDER NOTE - PATIENT PORTAL LINK FT
You can access the FollowMyHealth Patient Portal offered by [facility] by registering at the following website: [email or web address]. By joining [facility]’s FollowMyHealth portal, you will also be able to view your health information using other applications (apps) compatible with our system.

## 2022-09-09 NOTE — ED ADULT TRIAGE NOTE - CHIEF COMPLAINT QUOTE
Pt reports he is here for suture removal. Pt states he was told to return 2 says later by provider in ED. Pt had 12 sutures placed to head 12 days ago. Pt reports he is here for suture removal. Pt states he was seen in HED 2 days for removal and was told to return 2 days later by ED provider. Pt had 12 sutures placed to head 12 days ago.

## 2022-09-10 DIAGNOSIS — N40.0 BENIGN PROSTATIC HYPERPLASIA WITHOUT LOWER URINARY TRACT SYMPTOMS: ICD-10-CM
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
## 2022-09-10 DIAGNOSIS — Z87.442 PERSONAL HISTORY OF URINARY CALCULI: ICD-10-CM

## 2022-09-10 DIAGNOSIS — S01.01XA LACERATION WITHOUT FOREIGN BODY OF SCALP, INITIAL ENCOUNTER: ICD-10-CM
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
## 2022-12-15 NOTE — PHYSICAL EXAM
Physical Therapy    Visit Type: treatment  Precautions:  Medical precautions:  fall risk and abdominal precautions; standard precautions.  Patient adm with low hemoglobin  Due to bleeding from skin CA surgery  on his face this past Friday  Lines:     Basic: telemetry, external urinary catheter, IV and O2 (3 L n.c.; B SCD's)      Lines in chart and on patient reviewed, precautions maintained throughout session.  Hearing: hard of hearing, wears hearing aids left and wears hearing aids right  Safety Measures: chair alarm  SUBJECTIVE  Patient agreed to participate in therapy this date.  \" I'm feeling okay \"     During treatment session therapist was wearing mask and eye protection and patient was not wearing mask    Prior Living Situation  Information Provided By:: chart  Type of Home: House  # Steps to Enter: 0  # Steps in the Home: 0  Lives With: Daughter  Bathroom Shower/Tub: Tub/Shower unit  Bathroom Toilet: Raised  Bathroom Equipment: Shower chair, Toilet riser with arms  Bathroom Accessibility: Entry Level  Home Equipment: Hospital bed  Additional Comments: assist dressing bathign toileting meals and meds, Home O2 3.5 - 4 L; ; uses a rollator when amb at home    Prior Communication/Cognition  Prior Cognition: Intact    Prior Function  Prior ADL: Minimal Assist (Min)  Eating: Independent  Grooming: Independent  Bathing: Moderate Assist (Mod)  Upper Body Dressing: Minimal Assist (Min)  Lower Body Dressing: Minimal Assist (Min)  Toileting: Minimal Assist (Min)  Bed Mobility: Minimal Assist (Min)  Transfers: Minimal Assist (Min)  Toilet Transfers: Minimal Assist (Min)  Tub/Shower Transfers: Moderate Assist (Mod)  Ambulation in the Home: Modified  Independent  Steps into the Home: Minimal Assist (Min)  Transfer Equipment: Four wheeled walker  Hearing: Hard of hearing, Wears hearing aids right, Wears hearing aids left  Vocational: Retired  Additional Comments: Dtr is able to assist w/ all transfers and able to  assist  w/ all self care taks;  Patient / Family Goal: maximize function    Pain     Location: denies    At onset of session (out of 10): 0  RN informed on pain level     OBJECTIVE     Cognitive Status   Level of Consciousness   - alert  Affect/Behavior    - calm and cooperative  Orientation    - Oriented to: person (knows the month and year; and hospital setting)  Functional Communication   - Forms of Communication: verbal    Vitals:  Pulse Ox (%): 100       Standing Balance  (ART = base of support)  CGA with the RW       Bed Mobility  - Supine to sit: modified independent (with rail and HOB elevated)  Patient c/o dizziness upon sitting at the EOB  Transfers  Assistive devices: gait belt, 2-wheeled walker  - Sit to stand: contact guard/touching/steadying assist, with verbal cues (patient needs cues for hand placement)  - Stand to sit: contact guard/touching/steadying assist, with verbal cues (cues for hand placement)    Ambulation / Gait  - Assistive device: gait belt and two-wheeled walker  - Distance (feet unless otherwise indicated): 20  patient c/o dizziness after ambulating approx 10 ft thus amb back to chair for a total of 20 ft  - Assist Level: contact guard/touching/steadying assist  - Surface: even  - Description: unsteady, decreased step length left, decreased step length right and decreased kitty/pace        Interventions     Supine    Lower Extremity: Bilateral: ankle pumps, heel slides, hip abduction, hip adduction and TKE over bolster, AROM, 15 reps, 1 sets  Seated    Lower Extremity: Bilateral: knee flexion and knee extensions, AROM, 10 reps, 1 sets  Training provided: activity tolerance, balance retraining, bed mobility training, gait training, functional ambulation, positioning, transfer training, safety training and use of assistive device  Patient assisted with diaper change while standing prior to ambulaion  Skilled input: Verbal instruction/cues and tactile instruction/cues         ASSESSMENT    Impairments: balance deficits, activity tolerance and balance  Functional Limitations: all functional mobility   Patient currently limited by decreased balance , tolerance to activity, c/o dizziness and decreased  endurance which all contribute to decreased independence and increased fall risk. Recommend continued PT to progress patient toward his baseline status of modified independent ambulation using the rollator and restore his prior level of function to improve his safety and independence of mobility.   Discharge Recommendations   Recommendation for Discharge: PT IL: Patient requires 24 HOUR assistance to perform mobility and/or ADLs safely, Patient is appropriate for Physical Therapy 1-3 times per week (if not at baseline at time of d/c)        PT/OT Mobility Equipment for Discharge: has RW, rollator and w/c   PT/OT ADL Equipment for Discharge: No clear needs        Therapy Diagnosis:  Other Abnormalities of Gait and Mobility    Pain at End of Session: RN informed on pain level  Pain: 0/10, location: denies    Progress: progressing toward goals    • Skilled therapy is required to address these limitations in attempt to maximize the patient's independence.    Education:   - Present and ready to learn: patient  Education provided during session:  - Results of above outlined education: Verbalizes understanding, Demonstrates understanding and Needs reinforcement    Patient at End of Session:   Location: in chair  Safety measures: alarm system in place/re-engaged, call light within reach, equipment intact and lines intact  Handoff to: nurse    PLAN   Suggestions for next session as indicated: PT Frequency: 3-5 x per week  Frequency Comments: 12/15 home # 2    A minimum of 8 minutes per session x 1 week.  Interventions: balance, bed mobility, endurance training, functional transfer training, safety education, gait training, equipment eval/education and patient/family training  Agreement to plan and goals: patient  [de-identified] : right Shoulder\par The following exams were performed on the involved shoulder.  ROM, stability and strength were compared with contralateral shoulder for control:\par Range of Motion:  Active and Passive in FE, Abd, ER, IR, AbdER, AbdIR\par Tenderness Evaluation: Acromioclavicular joint, bicipital groove, rotator cuff insertion, joint line \par Strength: FE, Abd, ER, IR, AbdER, AbdIR\par Impingement:  Llanos, Neer, Crossbody\par Cuff Tests: Empty Can, Bear Hug, Belly Press, Liftoff, Hornblower\par Stability:  Apprehension/Relocation, A/P Load-shift (grade 1-4) v Contralateral, Sulcus, Jerk Test\par Biceps/SLAP: Radha, Nathan, Kathy, Aster, SLAP test\par All findings were within normal limits except for the following:\par Right shoulder exam demonstrates symmetric range of motion of the contralateral side with further elevation abduction greater than 160° and full internal and external rotation. Patient does have mild pain with abduction internal rotation position. He has positive Llanos and Neer. He has mild pain with empty can but no weakness. He has mild pain with but no weakness. Negative belly press and lift off. Negative labral examination.\par \par Examination is unchanged from prior evaluations. Range of motion demonstrate mild loss in range of motion. He's lacking 25° of extension. He has flexion 120° today. There is a trace to 1+ effusion. He has diffuse crepitus particularly in the patellofemoral joint and medial joint line. [de-identified] : X-rays of bilateral knees previously demonstrated moderate osteoarthritis.\par \par Prior MRI of the right shoulder demonstrated a high-grade partial thickness supraspinous tear and rotator cuff tendinosis as noted above agrees with goals and treatment plan        GOALS  Long Term Goals: (to be met by time of discharge from hospital)  Sit to supine: Patient will complete sit to supine supervision.  Status: progressing/ongoing  Supine to sit: Patient will complete supine to sit supervision.  Status: progressing/ongoing  Sit to stand: Patient will complete sit to stand transfer with 2-wheeled walker, supervision.   Status: progressing/ongoing  Stand to sit: Patient will complete stand to sit transfer with 2-wheeled walker, supervision.   Status: progressing/ongoing  Ambulation (even): Patient will ambulate on even surface for 50 feet with 2-wheeled walker, supervision.   Status: progressing/ongoing    Documented in the chart in the following areas: Assessment.      Therapy procedure time and total treatment time can be found documented on the Time Entry flowsheet

## 2023-01-03 ENCOUNTER — APPOINTMENT (OUTPATIENT)
Dept: VASCULAR SURGERY | Facility: CLINIC | Age: 73
End: 2023-01-03
Payer: MEDICARE

## 2023-01-03 VITALS
HEART RATE: 75 BPM | SYSTOLIC BLOOD PRESSURE: 154 MMHG | DIASTOLIC BLOOD PRESSURE: 87 MMHG | OXYGEN SATURATION: 98 % | TEMPERATURE: 98.2 F

## 2023-01-03 DIAGNOSIS — I83.893 VARICOSE VEINS OF BILATERAL LOWER EXTREMITIES WITH OTHER COMPLICATIONS: ICD-10-CM

## 2023-01-03 DIAGNOSIS — R25.2 CRAMP AND SPASM: ICD-10-CM

## 2023-01-03 DIAGNOSIS — I87.2 VENOUS INSUFFICIENCY (CHRONIC) (PERIPHERAL): ICD-10-CM

## 2023-01-03 PROCEDURE — 93970 EXTREMITY STUDY: CPT

## 2023-01-03 PROCEDURE — 99213 OFFICE O/P EST LOW 20 MIN: CPT | Mod: 25

## 2023-01-17 ENCOUNTER — APPOINTMENT (OUTPATIENT)
Dept: NEUROLOGY | Facility: CLINIC | Age: 73
End: 2023-01-17

## 2023-04-04 ENCOUNTER — APPOINTMENT (OUTPATIENT)
Dept: OTOLARYNGOLOGY | Facility: CLINIC | Age: 73
End: 2023-04-04
Payer: MEDICARE

## 2023-04-04 VITALS — HEIGHT: 66 IN | WEIGHT: 143 LBS | BODY MASS INDEX: 22.98 KG/M2

## 2023-04-04 DIAGNOSIS — Z78.9 OTHER SPECIFIED HEALTH STATUS: ICD-10-CM

## 2023-04-04 PROCEDURE — 31575 DIAGNOSTIC LARYNGOSCOPY: CPT

## 2023-04-04 PROCEDURE — 99203 OFFICE O/P NEW LOW 30 MIN: CPT | Mod: 25

## 2023-04-04 NOTE — ASSESSMENT
[FreeTextEntry1] : He has a several year history of chronic pharyngitis.  I did not see any suspicious masses or lesions.  He does not have a history of recurrent tonsillitis.  He may have irritation from smoking marijuana.  We also discussed the possibility of reflux.\par \par PLAN\par \par -findings and management options discussed in detail with the patient. \par - Saltwater gargles.  The patient was asked to avoid alcohol based mouthwashes\par - Reflux precautions and elevation of the head of bed at night.  Literature given.\par -I have asked him to avoid smoking.\par -Audiogram when he returns for evaluation for tinnitus and hearing loss\par - follow up in approximately 3 weeks.  If his symptoms or not improving, we will discuss obtaining an imaging study\par - call and return earlier if any concerns

## 2023-04-04 NOTE — CONSULT LETTER
[Dear  ___] : Dear  [unfilled], [Consult Letter:] : I had the pleasure of evaluating your patient, [unfilled]. [Please see my note below.] : Please see my note below. [Consult Closing:] : Thank you very much for allowing me to participate in the care of this patient.  If you have any questions, please do not hesitate to contact me. [Sincerely,] : Sincerely, [FreeTextEntry3] : Sandra Zuniga MD\par [facility]\par Otolaryngology – Head & Neck Surgery\par [facility]\par \par \par Department of Otolaryngology\par [facility]\par \par Office Tel: [phone]\par

## 2023-04-04 NOTE — HISTORY OF PRESENT ILLNESS
[de-identified] : NOEL DEAN is a 72 year old patient with a chronic sore throat since at least 2019.  He did not recall a preceding event.  He has a postnasal drip but no dysphagia, voice change, or bleeding.  He quit smoking tobacco many years ago.  He does smoke marijuana.  He denies a history of reflux.  He did not have recurrent tonsillitis in the past\par \par He may also have hearing loss.  He reports tinnitus

## 2023-04-17 ENCOUNTER — NON-APPOINTMENT (OUTPATIENT)
Age: 73
End: 2023-04-17

## 2023-04-25 ENCOUNTER — APPOINTMENT (OUTPATIENT)
Dept: OTOLARYNGOLOGY | Facility: CLINIC | Age: 73
End: 2023-04-25
Payer: MEDICARE

## 2023-04-25 PROCEDURE — 99213 OFFICE O/P EST LOW 20 MIN: CPT

## 2023-04-25 PROCEDURE — 92557 COMPREHENSIVE HEARING TEST: CPT

## 2023-04-25 PROCEDURE — 92567 TYMPANOMETRY: CPT

## 2023-04-25 NOTE — CONSULT LETTER
[Dear  ___] : Dear  [unfilled], [Please see my note below.] : Please see my note below. [Consult Closing:] : Thank you very much for allowing me to participate in the care of this patient.  If you have any questions, please do not hesitate to contact me. [Sincerely,] : Sincerely, [Courtesy Letter:] : I had the pleasure of seeing your patient, [unfilled], in my office today. [FreeTextEntry3] : Sandra Zuniga MD\par [facility]\par Otolaryngology – Head & Neck Surgery\par [facility]\par \par \par Department of Otolaryngology\par [facility] at [facility]\par \par Office Tel: [phone]\par

## 2023-04-25 NOTE — ASSESSMENT
[FreeTextEntry1] : He has a several year history of chronic pharyngitis.  The throat symptoms have improved although he still has some phlegm.\par \par He has bilateral tinnitus.  Audiogram showed a bilateral sensorineural hearing loss with a mild asymmetry in the right ear.\par \par PLAN\par \par -findings and management options discussed in detail with the patient. \par -Continue salt water gargles as needed\par -Continue reflux precautions\par -I again asked him to avoid smoking any substances\par -good aural hygiene\par -avoid using cotton swabs in the ears\par -wax removal drops as needed. \par -noise precautions\par -annual audiogram\par -I recommended hearing aid evaluation\par -He may use a nasal steroid spray and/or decongestant for eustachian tube dysfunction.  We can discuss other treatment options for eustachian tube dysfunction depending on how he does.\par -I am sending him for an MRI to rule out retrocochlear pathology causing the asymmetric hearing loss\par -Follow-up after the MRI\par - call and return earlier if any concerns

## 2023-04-25 NOTE — HISTORY OF PRESENT ILLNESS
[de-identified] : NOEL DEAN is a 72 year old patient here for follow-up.  I saw him earlier this month for a chronic sore throat since 2019.  He says it has been somewhat better.  He still has phlegm and occasional cough.  There were no suspicious masses or lesions on flexible laryngoscopy at his last visit.  He also reports a history of bilateral tinnitus.  He thinks that he hears better in the left ear.  He has no otalgia, otorrhea, or dizziness.  He does have mild eustachian tube dysfunction.  When he blows his nose or smiles, the hearing in the right ear improves.\par \par He is a former smoker who quit tobacco many years ago.  He does smoke marijuana\par No history of reflux or recurrent tonsillitis\par No history of recurrent middle ear infections, prior otologic surgery, ear trauma, or excessive noise exposure

## 2023-04-28 ENCOUNTER — OUTPATIENT (OUTPATIENT)
Dept: OUTPATIENT SERVICES | Facility: HOSPITAL | Age: 73
LOS: 1 days | End: 2023-04-28

## 2023-04-28 ENCOUNTER — APPOINTMENT (OUTPATIENT)
Dept: MRI IMAGING | Facility: CLINIC | Age: 73
End: 2023-04-28
Payer: MEDICARE

## 2023-04-28 PROCEDURE — 70553 MRI BRAIN STEM W/O & W/DYE: CPT | Mod: 26,MH

## 2023-04-29 ENCOUNTER — NON-APPOINTMENT (OUTPATIENT)
Age: 73
End: 2023-04-29

## 2023-05-19 ENCOUNTER — APPOINTMENT (OUTPATIENT)
Dept: OTOLARYNGOLOGY | Facility: CLINIC | Age: 73
End: 2023-05-19
Payer: MEDICARE

## 2023-05-19 VITALS — HEIGHT: 66 IN | BODY MASS INDEX: 22.98 KG/M2 | WEIGHT: 143 LBS

## 2023-05-19 PROCEDURE — 99213 OFFICE O/P EST LOW 20 MIN: CPT

## 2023-05-19 NOTE — ASSESSMENT
[FreeTextEntry1] : He has a history of eustachian tube dysfunction, tinnitus, and bilateral sensorineural hearing loss with asymmetry in the right ear.  MRI was negative for IAC masses\par \par PLAN\par \par -findings and management options discussed in detail with the patient. \par -Continue good ear hygiene\par -Monitor hearing\par -Management of tinnitus reviewed\par -I recommended hearing aid evaluation\par -Allergy and sinus medications as needed\par -We discussed management options for eustachian tube dysfunction if it is bothersome.  He could consider myringotomy with or without tube insertion and possible eustachian tube balloon dilation.  He is going to think it over but will continue with observation for now\par -Follow-up in approximately 6 months\par - call and return earlier if any concerns or worsening symptoms

## 2023-05-19 NOTE — HISTORY OF PRESENT ILLNESS
[de-identified] : NOEL DEAN is a 72 year old patient here to review his MRI.  He has bilateral sensorineural hearing loss with mild asymmetry in the right ear.  He suffers from tinnitus and eustachian tube dysfunction.  He said he is currently undergoing work-up for carotid artery stenosis.  The MRI showed no IAC lesions.  There were mild nonspecific white matter changes.  The sinuses were clear\par \par ENT History\par \par He is a former smoker who quit tobacco many years ago. He does smoke marijuana\par No history of reflux or recurrent tonsillitis\par No history of recurrent middle ear infections, prior otologic surgery, ear trauma, or excessive noise exposure \par Nasal endoscopy and flexible laryngoscopy–no masses or lesions

## 2023-05-19 NOTE — CONSULT LETTER
[Dear  ___] : Dear  [unfilled], [Courtesy Letter:] : I had the pleasure of seeing your patient, [unfilled], in my office today. [Please see my note below.] : Please see my note below. [Consult Closing:] : Thank you very much for allowing me to participate in the care of this patient.  If you have any questions, please do not hesitate to contact me. [Sincerely,] : Sincerely, [FreeTextEntry3] : Sandra Zuniga MD\par [facility]\par Otolaryngology – Head & Neck Surgery\par [facility]\par \par \par Department of Otolaryngology\par [facility] at [facility]\par \par Office Tel: [phone]\par

## 2023-09-24 ENCOUNTER — NON-APPOINTMENT (OUTPATIENT)
Age: 73
End: 2023-09-24

## 2023-10-02 ENCOUNTER — NON-APPOINTMENT (OUTPATIENT)
Age: 73
End: 2023-10-02

## 2023-10-30 ENCOUNTER — APPOINTMENT (OUTPATIENT)
Dept: NEUROLOGY | Facility: CLINIC | Age: 73
End: 2023-10-30

## 2024-01-22 NOTE — ED ADULT NURSE NOTE - NS ED NURSE RECORD ANOTHER VITAL SIGN
"Called pt att'ing to get insurance info. Pt reported that he has Blue Plus through the state, but doesn't have any copies of his insurance card. Writer encouraged pt to call [employer] for his policy number and group number, or work with his  or other current service provider to do so. Pt reported that he has appts this week, will try to get his insurance information during, and relay it to [facility]. Writer gave pt [facility] Admissions phone number and explained that he can ask for an  or just say \"gildardo\" when he calls and staff will call language services.    Writer also lvm for Briseida at [facility] req'ing pt's PMI or policy and group number under the assumption that if pt is still rec'ing services at [facility], they're billing an entity and could potentially share that information.  " Yes

## 2024-01-29 ENCOUNTER — APPOINTMENT (OUTPATIENT)
Dept: ORTHOPEDIC SURGERY | Facility: CLINIC | Age: 74
End: 2024-01-29
Payer: MEDICARE

## 2024-01-29 VITALS — RESPIRATION RATE: 16 BRPM | WEIGHT: 143 LBS | HEIGHT: 66 IN | BODY MASS INDEX: 22.98 KG/M2

## 2024-01-29 DIAGNOSIS — M75.42 IMPINGEMENT SYNDROME OF LEFT SHOULDER: ICD-10-CM

## 2024-01-29 DIAGNOSIS — M17.12 UNILATERAL PRIMARY OSTEOARTHRITIS, LEFT KNEE: ICD-10-CM

## 2024-01-29 DIAGNOSIS — M17.11 UNILATERAL PRIMARY OSTEOARTHRITIS, RIGHT KNEE: ICD-10-CM

## 2024-01-29 PROCEDURE — 99213 OFFICE O/P EST LOW 20 MIN: CPT

## 2024-02-14 ENCOUNTER — APPOINTMENT (OUTPATIENT)
Dept: ORTHOPEDIC SURGERY | Facility: CLINIC | Age: 74
End: 2024-02-14
Payer: MEDICARE

## 2024-02-14 VITALS — BODY MASS INDEX: 22.98 KG/M2 | RESPIRATION RATE: 16 BRPM | HEIGHT: 66 IN | WEIGHT: 143 LBS

## 2024-02-14 DIAGNOSIS — M19.012 PRIMARY OSTEOARTHRITIS, LEFT SHOULDER: ICD-10-CM

## 2024-02-14 DIAGNOSIS — M75.22 BICIPITAL TENDINITIS, LEFT SHOULDER: ICD-10-CM

## 2024-02-14 DIAGNOSIS — M43.02 SPONDYLOLYSIS, CERVICAL REGION: ICD-10-CM

## 2024-02-14 PROCEDURE — 73030 X-RAY EXAM OF SHOULDER: CPT | Mod: LT

## 2024-02-14 PROCEDURE — 99213 OFFICE O/P EST LOW 20 MIN: CPT

## 2024-02-15 ENCOUNTER — APPOINTMENT (OUTPATIENT)
Dept: NEUROLOGY | Facility: CLINIC | Age: 74
End: 2024-02-15
Payer: MEDICARE

## 2024-02-15 VITALS
HEIGHT: 66 IN | TEMPERATURE: 96.3 F | WEIGHT: 143 LBS | DIASTOLIC BLOOD PRESSURE: 82 MMHG | BODY MASS INDEX: 22.98 KG/M2 | SYSTOLIC BLOOD PRESSURE: 136 MMHG | OXYGEN SATURATION: 98 % | HEART RATE: 56 BPM

## 2024-02-15 DIAGNOSIS — R41.89 OTHER SYMPTOMS AND SIGNS INVOLVING COGNITIVE FUNCTIONS AND AWARENESS: ICD-10-CM

## 2024-02-15 PROCEDURE — 99203 OFFICE O/P NEW LOW 30 MIN: CPT

## 2024-02-15 RX ORDER — ATORVASTATIN CALCIUM 20 MG/1
20 TABLET, FILM COATED ORAL
Qty: 30 | Refills: 3 | Status: ACTIVE | COMMUNITY
Start: 2017-09-11

## 2024-02-15 RX ORDER — LOSARTAN POTASSIUM 50 MG/1
50 TABLET, FILM COATED ORAL
Refills: 0 | Status: DISCONTINUED | COMMUNITY
End: 2024-02-15

## 2024-02-15 RX ORDER — METOPROLOL SUCCINATE 25 MG/1
25 TABLET, EXTENDED RELEASE ORAL DAILY
Refills: 0 | Status: ACTIVE | COMMUNITY

## 2024-02-15 RX ORDER — ASPIRIN 81 MG
81 TABLET, DELAYED RELEASE (ENTERIC COATED) ORAL DAILY
Refills: 0 | Status: ACTIVE | COMMUNITY

## 2024-02-15 RX ORDER — L. ACIDOPHILUS/L. RHAMNOSUS 5B CELL
CAPSULE,DELAYED RELEASE (ENTERIC COATED) ORAL
Refills: 0 | Status: DISCONTINUED | COMMUNITY
End: 2024-02-15

## 2024-02-15 RX ORDER — LOSARTAN POTASSIUM 50 MG/1
50 TABLET, FILM COATED ORAL
Qty: 90 | Refills: 0 | Status: DISCONTINUED | COMMUNITY
Start: 2017-09-11 | End: 2024-02-15

## 2024-02-15 RX ORDER — PNV NO.95/FERROUS FUM/FOLIC AC 28MG-0.8MG
TABLET ORAL
Refills: 0 | Status: ACTIVE | COMMUNITY

## 2024-02-15 NOTE — PHYSICAL EXAM
[FreeTextEntry1] : General: no apparent distress Mental Status: awake and alert, speech fluent and prosodic with no paraphasic errors, MOCA 21/30 Cranial Nerves: tracks in all directions, face symmetric, no dysarthria Motor: no abnormal movements, no orbiting or pronator drift Coordination: no dysmetria on finger-nose-finger testing Gait: narrow base, normal stance and stride, no Romberg [Over the Past 2 Weeks, Have You Felt Down, Depressed, or Hopeless?] : 1.) Over the past 2 weeks, have you felt down, depressed, or hopeless? No [Over the Past 2 Weeks, Have You Felt Little Interest or Pleasure Doing Things?] : 2.) Over the past 2 weeks, have you felt little interest or pleasure doing things? No

## 2024-02-15 NOTE — ASSESSMENT
[FreeTextEntry1] : 73-year-old man with cognitive complaints x 4 years which he attributes to chronic Lyme disease, with MOCA 21/30 on today's exam.  We discussed the fact that this could be related to Lyme disease, but could also represent another medical problem or neurodegenerative disorder.  Will proceed with additional evaluation before deciding on treatment plan.  -Neuropsych testing -TSH, B12, RPR, HIV, Lyme IgG/IgM  RTC after above tests

## 2024-02-15 NOTE — HISTORY OF PRESENT ILLNESS
[FreeTextEntry1] : 73-year-old man presenting for evaluation of cognitive impairment. He reports that he was diagnosed with chronic Lyme 4 years ago.  Symptoms include brain fog, feeling of fullness in his head, feeling like there is a "blob" that he has to get  through to do normal daily tasks.  He has been treated with levofloxacin and ceftriaxone with transient improvement.  Used to see a Lyme doctor and  was taking herbal drips which seemed to be helping but then started irritating his stomach.  Symptoms resolved for 2 days after colonoscopy prep, then came back.  He has seen 2 prior neurologists and had an MRI of his brain which was normal.  He reports that his mood is good and he has not lost interest in activities, just has more trouble doing them.

## 2024-02-20 LAB
B BURGDOR AB SER-IMP: NEGATIVE
B BURGDOR IGG+IGM SER QL: 0.81 INDEX
HIV1+2 AB SPEC QL IA.RAPID: NONREACTIVE
T PALLIDUM AB SER QL IA: NEGATIVE
TSH SERPL-ACNC: 1.66 UIU/ML
VIT B12 SERPL-MCNC: 922 PG/ML

## 2024-03-13 ENCOUNTER — APPOINTMENT (OUTPATIENT)
Dept: OTOLARYNGOLOGY | Facility: CLINIC | Age: 74
End: 2024-03-13
Payer: MEDICARE

## 2024-03-13 VITALS — HEIGHT: 66 IN | BODY MASS INDEX: 22.98 KG/M2 | WEIGHT: 143 LBS

## 2024-03-13 DIAGNOSIS — H69.92 UNSPECIFIED EUSTACHIAN TUBE DISORDER, LEFT EAR: ICD-10-CM

## 2024-03-13 PROCEDURE — 99213 OFFICE O/P EST LOW 20 MIN: CPT | Mod: 25

## 2024-03-13 PROCEDURE — 92567 TYMPANOMETRY: CPT

## 2024-03-13 PROCEDURE — 31575 DIAGNOSTIC LARYNGOSCOPY: CPT

## 2024-03-13 PROCEDURE — 92557 COMPREHENSIVE HEARING TEST: CPT

## 2024-03-13 NOTE — PHYSICAL EXAM
[TextEntry] : PHYSICAL EXAM  General: The patient was alert, oriented and in no distress. Voice was clear.  Face: The patient had no facial asymmetry or mass. The skin was unremarkable.  Ears: Hearing normal to conversational voice External ears were normal without deformity. External ear canals: Small amount of cerumen was removed atraumatically under the microscope with otologic instruments.  No inflammation Tympanic membranes: Intact.  No perforation or effusion  Nose:  The external nose had no significant deformity.    . On anterior rhinoscopy, the nasal mucosa was clear.  The anterior septum was grossly midline. There were no visualized polyps, purulence  or masses.   Oral cavity: Oral mucosa- normal. Oral and base of tongue- clear and without mass. Gingival and buccal mucosa- moist and without lesions. Palate- the palate moved well. There was no cleft palate. There appeared to be good salivary flow.   Oral cavity/oropharynx- no pus, erythema or mass Base of tongue soft to palpation  Neck:  The neck was symmetrical. The parotid and submandibular glands were normal without masses. The trachea was midline and there was no unusual crepitus. Thyroid was smooth and nontender and no masses were palpated. No masses  Lymphatics: Cervical adenopathy- none.    PROCEDURE:  FLEXIBLE LARYNGOSCOPY, NASAL ENDOSCOPY   Surgeon: Dr. Zuniga Indication: Chronic pharyngitis, history of smoking, assess for lesions, inadequate/inability to tolerate transoral exam Anesthetic: Topical lidocaine and Afrin Procedure: The patient was placed in a sitting position.  Following application of the topical anesthetic and decongestant, exam was performed with a flexible telescope.  The scope was passed along the right nasal floor to the nasopharynx.  It was then passed into the region of the middle meatus, middle turbinate, and sphenoethmoid region.  An identical procedure was performed on the left side.  The following findings were noted:  Nasal mucosa: Mild edema Septum: Deviated Right nasal cavity      Inferior turbinate: Normal      Middle turbinate: normal      Superior turbinate: normal      Inferior meatus: no pus, polyps or congestion      Middle meatus:  no pus, polyps or congestion       Superior meatus:  no pus, polyps, or congestion      Sphenoethmoidal recess: no pus, polyps or congestion  Left nasal cavity      Inferior turbinate: Normal      Middle turbinate: normal      Superior turbinate: normal      Inferior meatus: no pus, polyps or congestion      Middle meatus: no pus, polyps, or congestion      Superior meatus:  no pus, polyps, or congestion      Sphenoethmoidal recess: no pus, polyps or congestion   Nasopharynx: no masses, choanae patent, no adenoid tissue  Base of tongue and vallecula: no masses or asymmetry Posterior pharyngeal wall: no masses.  Hypopharynx: symmetrical. No masses Pyriform sinuses: no lesions or pooling of secretions Epiglottis: normal. No edema or lesions Aryepiglottic folds: normal. No lesions.  There was a small 2 mm benign-appearing cystic lesion on the right lateral surface of the larynx.  There was no airway obstruction. True vocal cords: clear and mobile. No lesions. Airway patent False vocal cords: normal Ventricles: no masses.  Arytenoids: Normal Interarytenoid area: no masses.  Normal Subglottis: normal. no masses    AUDIOGRAM- test ordered and the results reviewed and discussed with the patient.  It was compared with his prior audiogram Hearing-bilateral sensorineural hearing loss with mild asymmetry in the right ear Word recognition-normal Tympanograms- AD- type A, AS- type Ad

## 2024-03-13 NOTE — ASSESSMENT
[FreeTextEntry1] : He has bilateral sensorineural hearing loss with a mild asymmetry in the right ear.  We reviewed his repeat audiogram  He has left eustachian tube dysfunction  He has a 2-month history of chronic pharyngitis.  There were no suspicious masses or lesions on flexible endoscopy.  He did have a small benign-appearing cyst on the right lateral side of the larynx.  PLAN  -findings and management options discussed in detail with the patient.  -good aural hygiene -annual audiogram -Hearing aid evaluation recommended - Smoking cessation strongly recommended - We discussed treatment options for eustachian tube dysfunction.  He will try to try nasal steroid spray and decongestant.  He could consider myringotomy with or without tube insertion or eustachian tube balloon dilation.  He is going to think this over - I do not see an obvious source of the throat irritation.  It could be due to smoking. - We discussed obtaining a CT scan of the neck to ensure there are no lesions that are not visible.  He would like to think this over.  The small cyst on flexible laryngoscopy appears benign.  He was told however, the only way to rule out malignancy would be to biopsy it. - I will see him back in 4 weeks if his symptoms have not resolved.  If the throat symptoms have resolved, I will see him back in 6 months -call and return earlier if any concerns.

## 2024-03-13 NOTE — HISTORY OF PRESENT ILLNESS
[de-identified] : NOEL DEAN is a 73 year old patient Here for follow-up for bilateral sensorineural hearing loss with mild asymmetry in the right ear.  He had an MRI which showed no IAC masses.  He is looking into getting hearing aids.  He also has a 2-month history of a sore throat.  He has no voice change, dysphagia, or bleeding.  He is a former cigarette smoker.  He does smoke marijuana.  He denies a history of reflux.  He did not feel that he had a preceding upper respiratory tract infection.  He does have mild nasal congestion.  He has no nasal drainage. He does have left eustachian tube dysfunction.    ENT History He is a former smoker who quit tobacco many years ago. He does smoke marijuana No history of reflux or recurrent tonsillitis  No history of recurrent middle ear infections, prior otologic surgery, ear trauma, or excessive noise exposure

## 2024-03-13 NOTE — CONSULT LETTER
[Dear  ___] : Dear  [unfilled], [Please see my note below.] : Please see my note below. [Courtesy Letter:] : I had the pleasure of seeing your patient, [unfilled], in my office today. [Sincerely,] : Sincerely, [Consult Closing:] : Thank you very much for allowing me to participate in the care of this patient.  If you have any questions, please do not hesitate to contact me. [FreeTextEntry3] : Sandra Zuniga MD [facility] at [facility]  Head & Neck Surgery [facility]   Department of Otolaryngology [facility] [employer] [facility] at [facility]  Office Tel: [phone]

## 2024-03-14 ENCOUNTER — APPOINTMENT (OUTPATIENT)
Dept: NEUROLOGY | Facility: CLINIC | Age: 74
End: 2024-03-14
Payer: MEDICARE

## 2024-03-14 VITALS
TEMPERATURE: 97.6 F | HEIGHT: 66 IN | SYSTOLIC BLOOD PRESSURE: 140 MMHG | BODY MASS INDEX: 22.66 KG/M2 | WEIGHT: 141 LBS | OXYGEN SATURATION: 99 % | DIASTOLIC BLOOD PRESSURE: 88 MMHG | HEART RATE: 69 BPM

## 2024-03-14 DIAGNOSIS — F03.A0 UNSPECIFIED DEMENTIA, MILD, WITHOUT BEHAVIORAL DISTURBANCE, PSYCHOTIC DISTURBANCE, MOOD DISTURBANCE, AND ANXIETY: ICD-10-CM

## 2024-03-14 PROCEDURE — G2211 COMPLEX E/M VISIT ADD ON: CPT

## 2024-03-14 PROCEDURE — 99213 OFFICE O/P EST LOW 20 MIN: CPT

## 2024-03-15 NOTE — DATA REVIEWED
[de-identified] : MRI brain 5/19/2023 independently reviewed and reviewed with patient, unremarkable [de-identified] : Labs 2/15/2024  B12, TSH, Lyme, RPR, HIV negative

## 2024-03-15 NOTE — ASSESSMENT
[FreeTextEntry1] : 73-year-old old man with cognitive complaints and MOCA 21/30, with unremarkable brain MRI and blood work.  I am concerned, as is he, that this may be a neurodegenerative disorder such as Alzheimer's disease.    -PET scan to look for evidence of Alzheimer's disease or another neurodegenerative disorder -Neuropsych testing has been scheduled for September 2024  RTC 6 months

## 2024-03-15 NOTE — HISTORY OF PRESENT ILLNESS
[FreeTextEntry1] : Presents for follow up of cognitive impairment. Since last visit completed labs and MRI brain. Reports that he continues to notice issues with both short and long term memory, feeling like he is in a fog, severe fatigue.

## 2024-03-28 ENCOUNTER — RESULT REVIEW (OUTPATIENT)
Age: 74
End: 2024-03-28

## 2024-03-28 ENCOUNTER — OUTPATIENT (OUTPATIENT)
Dept: OUTPATIENT SERVICES | Facility: HOSPITAL | Age: 74
LOS: 1 days | End: 2024-03-28
Payer: MEDICARE

## 2024-03-28 ENCOUNTER — APPOINTMENT (OUTPATIENT)
Dept: NUCLEAR MEDICINE | Facility: HOSPITAL | Age: 74
End: 2024-03-28

## 2024-03-28 LAB — GLUCOSE BLDC GLUCOMTR-MCNC: 88 MG/DL — SIGNIFICANT CHANGE UP (ref 70–99)

## 2024-03-28 PROCEDURE — A9552: CPT

## 2024-03-28 PROCEDURE — 78999 UNLISTED MISC PX DX NUC MED: CPT | Mod: 26,MH

## 2024-03-28 PROCEDURE — 78608 BRAIN IMAGING (PET): CPT | Mod: 26,MH

## 2024-03-28 PROCEDURE — 78608 BRAIN IMAGING (PET): CPT

## 2024-03-28 PROCEDURE — 82962 GLUCOSE BLOOD TEST: CPT

## 2024-03-28 PROCEDURE — 78999 UNLISTED MISC PX DX NUC MED: CPT

## 2024-04-10 ENCOUNTER — APPOINTMENT (OUTPATIENT)
Dept: NEUROLOGY | Facility: CLINIC | Age: 74
End: 2024-04-10
Payer: MEDICARE

## 2024-04-10 PROCEDURE — 99213 OFFICE O/P EST LOW 20 MIN: CPT

## 2024-04-12 ENCOUNTER — APPOINTMENT (OUTPATIENT)
Dept: NEUROLOGY | Facility: CLINIC | Age: 74
End: 2024-04-12
Payer: MEDICARE

## 2024-04-12 PROCEDURE — 96139 PSYCL/NRPSYC TST TECH EA: CPT

## 2024-04-12 PROCEDURE — 96133 NRPSYC TST EVAL PHYS/QHP EA: CPT

## 2024-04-12 PROCEDURE — 96116 NUBHVL XM PHYS/QHP 1ST HR: CPT

## 2024-04-12 PROCEDURE — 96132 NRPSYC TST EVAL PHYS/QHP 1ST: CPT

## 2024-04-12 PROCEDURE — 96138 PSYCL/NRPSYC TECH 1ST: CPT

## 2024-04-17 ENCOUNTER — APPOINTMENT (OUTPATIENT)
Dept: NEUROLOGY | Facility: CLINIC | Age: 74
End: 2024-04-17

## 2024-05-06 ENCOUNTER — APPOINTMENT (OUTPATIENT)
Dept: OTOLARYNGOLOGY | Facility: CLINIC | Age: 74
End: 2024-05-06
Payer: MEDICARE

## 2024-05-06 DIAGNOSIS — J31.2 CHRONIC PHARYNGITIS: ICD-10-CM

## 2024-05-06 PROCEDURE — 99213 OFFICE O/P EST LOW 20 MIN: CPT

## 2024-05-06 NOTE — ASSESSMENT
[FreeTextEntry1] : He has a least a month history of dysphagia.  He had been complaining of chronic throat discomfort prior to that.  The discomfort has improved.  On exam, he has supraglottic hyperfunctioning with 2 benign-appearing cysts-1 on the lateral surface of the larynx on the right side and 1 in the outer surface of the epiglottis/vallecula.  He has been exposed to a lot of dust in his apartment which can be causing throat irritation.  He also smokes marijuana.  Plan -Findings and management options were discussed with the patient. - Salt water gargles as needed - I recommended air purifiers for the house and frequent vacuuming. -he should avoid smoking - We discussed obtaining a CT scan of the neck for further evaluation of the cystic lesions as well as to rule out other possible masses.  We also discussed obtaining a modified barium swallow for further evaluation of the swallowing.  At this point, he would like to start with a modified barium swallow.  He was again told that although the cysts look benign, the only way to ensure that would be to rule out malignancy. - I will see him back after the modified barium swallow.  We will see if CT scan is needed - He should call and return earlier if any problems or worsening symptoms

## 2024-05-06 NOTE — PHYSICAL EXAM
[TextEntry] : General: The patient was alert, oriented and in no distress. Voice was clear.  Face: The patient had no facial asymmetry or mass. The skin was unremarkable.  Ears: Hearing normal to conversational voice External ears were normal without deformity. External ear canals:no cerumen or inflammation Tympanic membranes: Intact. No perforation or effusion  Nose: The external nose had no significant deformity. . On anterior rhinoscopy, the nasal mucosa was clear. The anterior septum was grossly midline. There were no visualized polyps, purulence or masses.  Oral cavity: Oral mucosa- normal. Oral and base of tongue- clear and without mass. Gingival and buccal mucosa- moist and without lesions. Palate- the palate moved well. There was no cleft palate. There appeared to be good salivary flow. Oral cavity/oropharynx- no pus, erythema or mass Base of tongue soft to palpation  Neck: The neck was symmetrical. The parotid and submandibular glands were normal without masses. The trachea was midline and there was no unusual crepitus. Thyroid was smooth and nontender and no masses were palpated. No masses  Lymphatics: Cervical adenopathy- none.    PROCEDURE: FLEXIBLE LARYNGOSCOPY   Surgeon: Dr. Zuniga Indication: Chronic pharyngitis, dysphagia, laryngeal cysts. history of smoking, inadequate/inability to tolerate transoral exam Anesthetic: Topical lidocaine and Afrin Procedure: The patient was placed in a sitting position. Following application of the topical anesthetic and decongestant, exam was performed with a flexible telescope. The scope was passed along the right nasal floor to the nasopharynx. It was then passed into the region of the middle meatus, middle turbinate, and sphenoethmoid region.  The following findings were noted:  Nasal mucosa: Mild edema Septum: Deviated  Right nasal cavity  Inferior turbinate: Normal  Middle turbinate: normal  Superior turbinate: normal  Inferior meatus: no pus, polyps or congestion  Middle meatus: no pus, polyps or congestion  Superior meatus: no pus, polyps, or congestion  Sphenoethmoidal recess: no pus, polyps or congestion   Nasopharynx: no masses, choanae patent, no adenoid tissue  Base of tongue and vallecula: no masses or asymmetry Posterior pharyngeal wall: no masses. Hypopharynx: symmetrical. No masses Pyriform sinuses: no lesions or pooling of secretions Epiglottis: normal. No edema or lesions. possible small cyst on the outer surface of the left side of the epiglottis or possibly in the vallecula.  Aryepiglottic folds: normal. No lesions. There was a small 3 mm benign-appearing cystic lesion on the right lateral surface of the larynx. There was no airway obstruction. True vocal cords: clear and mobile. No lesions. Airway patent.  There is supraglottic hyperfunctioning. False vocal cords: normal Ventricles: no masses. Arytenoids: Normal Interarytenoid area: no masses. Normal Subglottis: normal. no masses

## 2024-05-06 NOTE — HISTORY OF PRESENT ILLNESS
[de-identified] : NOEL DEAN is a 73 year old patient Here for follow-up for chronic pharyngitis.  He had a sore throat at his last visit.  That has improved but he now feels that he has dysphagia.  He feels that food and liquids are getting caught.  He has no voice change, difficulty breathing, or severe pain.  He has been under a lot of stress.  He has also been exposed to a lot of dust from construction in the apartment below him.  He had 2 benign-appearing cysts on flexible laryngoscopy.  We had discussed possibility of reflux although he did not have laryngeal findings consistent with that.  He does not smoke tobacco but does smoke marijuana  ENT History He is a former smoker who quit tobacco many years ago. He does smoke marijuana No history of reflux or recurrent tonsillitis  No history of recurrent middle ear infections, prior otologic surgery, ear trauma, or excessive noise exposure He has bilateral sensorineural hearing loss with asymmetry in the right ear. MRI showed no IAC masses He is undergoing neurology work up

## 2024-05-07 ENCOUNTER — APPOINTMENT (OUTPATIENT)
Dept: NEUROLOGY | Facility: CLINIC | Age: 74
End: 2024-05-07
Payer: MEDICARE

## 2024-05-07 PROCEDURE — 96133 NRPSYC TST EVAL PHYS/QHP EA: CPT | Mod: 2W

## 2024-05-15 ENCOUNTER — APPOINTMENT (OUTPATIENT)
Dept: NEUROLOGY | Facility: CLINIC | Age: 74
End: 2024-05-15
Payer: MEDICARE

## 2024-05-15 DIAGNOSIS — G31.84 MILD COGNITIVE IMPAIRMENT, SO STATED: ICD-10-CM

## 2024-05-15 PROCEDURE — 99213 OFFICE O/P EST LOW 20 MIN: CPT

## 2024-05-16 PROBLEM — G31.84 MILD COGNITIVE IMPAIRMENT: Status: ACTIVE | Noted: 2024-05-16

## 2024-05-30 ENCOUNTER — APPOINTMENT (OUTPATIENT)
Dept: OTOLARYNGOLOGY | Facility: CLINIC | Age: 74
End: 2024-05-30
Payer: MEDICARE

## 2024-05-30 DIAGNOSIS — H69.93 UNSPECIFIED EUSTACHIAN TUBE DISORDER, BILATERAL: ICD-10-CM

## 2024-05-30 DIAGNOSIS — R42 DIZZINESS AND GIDDINESS: ICD-10-CM

## 2024-05-30 DIAGNOSIS — H93.13 TINNITUS, BILATERAL: ICD-10-CM

## 2024-05-30 PROCEDURE — 92567 TYMPANOMETRY: CPT

## 2024-05-30 PROCEDURE — 92557 COMPREHENSIVE HEARING TEST: CPT

## 2024-05-30 PROCEDURE — 99213 OFFICE O/P EST LOW 20 MIN: CPT

## 2024-05-30 NOTE — ASSESSMENT
[FreeTextEntry1] : He has a history of bilateral sensorineural hearing loss with asymmetry in the right ear.  He had COVID last week.  He had hearing loss which has since recovered.  His ears were normal on exam.  Repeat audiogram showed a stable hearing loss  He has chronic disequilibrium  He has history of dysphagia.  Modified barium swallow is pending.  On exam he has 2 benign-appearing laryngeal cyst.  Plan -Findings and management options were discussed with the patient. - Continue good ear hygiene - Monitor hearing - He is going for hearing aid evaluation - Modified barium swallow pending - We will again discuss obtaining a CT scan of the neck.  The laryngeal cysts look benign, but we will discuss further workup again. - Vestibular therapy - I will see him back after the modified barium swallow - he will call return earlier if any problems

## 2024-05-30 NOTE — PHYSICAL EXAM
[TextEntry] : General: The patient was alert, oriented and in no distress. Voice was clear.  Face: The patient had no facial asymmetry or mass. The skin was unremarkable.  Ears: Hearing normal to conversational voice External ears were normal without deformity. External ear canals:scant cerumen removed atraumatically from the ears with a curette under the microscope. no inflammation Tympanic membranes: Intact. No perforation or effusion  Nose: The external nose had no significant deformity. . On anterior rhinoscopy, the nasal mucosa was clear. The anterior septum was grossly midline. There were no visualized polyps, purulence or masses.  Oral cavity: Oral mucosa- normal. Oral and base of tongue- clear and without mass. Gingival and buccal mucosa- moist and without lesions. Palate- the palate moved well. There was no cleft palate. There appeared to be good salivary flow. Oral cavity/oropharynx- no pus, erythema or mass Base of tongue soft to palpation  Neck: The neck was symmetrical. The parotid and submandibular glands were normal without masses. The trachea was midline and there was no unusual crepitus. Thyroid was smooth and nontender and no masses were palpated. No masses  Lymphatics: Cervical adenopathy- none.

## 2024-06-14 ENCOUNTER — APPOINTMENT (OUTPATIENT)
Dept: RADIOLOGY | Facility: HOSPITAL | Age: 74
End: 2024-06-14
Payer: MEDICARE

## 2024-06-14 ENCOUNTER — OUTPATIENT (OUTPATIENT)
Dept: OUTPATIENT SERVICES | Facility: HOSPITAL | Age: 74
LOS: 1 days | End: 2024-06-14

## 2024-06-14 PROCEDURE — 74230 X-RAY XM SWLNG FUNCJ C+: CPT

## 2024-06-14 PROCEDURE — 74230 X-RAY XM SWLNG FUNCJ C+: CPT | Mod: 26

## 2024-06-14 PROCEDURE — 92611 MOTION FLUOROSCOPY/SWALLOW: CPT | Mod: GN

## 2024-06-20 ENCOUNTER — APPOINTMENT (OUTPATIENT)
Dept: NEUROLOGY | Facility: CLINIC | Age: 74
End: 2024-06-20

## 2024-06-20 ENCOUNTER — APPOINTMENT (OUTPATIENT)
Dept: OTOLARYNGOLOGY | Facility: CLINIC | Age: 74
End: 2024-06-20
Payer: MEDICARE

## 2024-06-20 DIAGNOSIS — H90.3 SENSORINEURAL HEARING LOSS, BILATERAL: ICD-10-CM

## 2024-06-20 DIAGNOSIS — J38.7 OTHER DISEASES OF LARYNX: ICD-10-CM

## 2024-06-20 DIAGNOSIS — J00 ACUTE NASOPHARYNGITIS [COMMON COLD]: ICD-10-CM

## 2024-06-20 DIAGNOSIS — R13.14 DYSPHAGIA, PHARYNGOESOPHAGEAL PHASE: ICD-10-CM

## 2024-06-20 PROCEDURE — 99214 OFFICE O/P EST MOD 30 MIN: CPT

## 2024-06-20 RX ORDER — MUPIROCIN 20 MG/G
2 OINTMENT TOPICAL
Qty: 22 | Refills: 1 | Status: ACTIVE | COMMUNITY
Start: 2024-06-20 | End: 1900-01-01

## 2024-06-20 NOTE — PHYSICAL EXAM
[TextEntry] : General: The patient was alert, oriented and in no distress. Voice was clear.  Face: The patient had no facial asymmetry or mass. The skin was unremarkable.  Ears: Hearing normal to conversational voice External ears were normal without deformity. External ear canals: no cerumen or inflammation Tympanic membranes: Intact. No perforation or effusion  Nose: The external nose had no significant deformity. . On anterior rhinoscopy, the nasal mucosa was clear Except for an area of inflammation in the left lateral nasal vestibule.  There was no abscess.  There was a small scab as well. The anterior septum was grossly midline. There were no visualized polyps, purulence or masses.  Oral cavity: Oral mucosa- normal. Oral and base of tongue- clear and without mass. Gingival and buccal mucosa- moist and without lesions. Palate- the palate moved well. There was no cleft palate. There appeared to be good salivary flow. Oral cavity/oropharynx- no pus, erythema or mass Base of tongue soft to palpation  Neck: The neck was symmetrical. The parotid and submandibular glands were normal without masses. The trachea was midline and there was no unusual crepitus. Thyroid was smooth and nontender and no masses were palpated. No masses  Lymphatics: Cervical adenopathy- none.

## 2024-06-20 NOTE — HISTORY OF PRESENT ILLNESS
[de-identified] : NOEL DEAN is a 73 year old patient With a history of hearing loss, reflux, laryngeal cyst, and dysphagia.  He had the modified barium swallow.  We reviewed the results.  There was no aspiration.  There was some retrograde bolus flow in the middle esophagus.  The radiologist did not report anything of concern related to that.  He said that the dysphagia has improved.  He has also had left nasal pain and intermittent swelling.  He does trim the nasal hairs.  He has gotten hearing aids.   ENT History He is a former smoker who quit tobacco many years ago. He does smoke marijuana No history of reflux or recurrent tonsillitis 2 benign appearing laryngeal cysts on FL  No history of recurrent middle ear infections, prior otologic surgery, ear trauma, or excessive noise exposure He has bilateral sensorineural hearing loss with asymmetry in the right ear. MRI showed no IAC masses He is undergoing neurology work up

## 2024-06-20 NOTE — ASSESSMENT
[FreeTextEntry1] : He has nasal pain due to a small area of inflammation of the nasal vestibule likely due to folliculitis.  There was no abscess or suspicious mass seen.  He has a history of dysphagia.  May be related to reflux.  We reviewed the modified barium results.  he has chronic disequilibrium.  He has not yet gone for vestibular therapy evaluation  Plan -Findings and management options were discussed with the patient. - Continue good ear hygiene - Monitor hearing - he will follow-up with the audiologist as needed - I recommended GI evaluation to review the modified barium test results - Continue management of reflux - We again discussed obtaining a CT scan of the neck for further evaluation of the laryngeal cyst.  They appear benign but the CT scan would give us more information.  He would like to continue with observation - Vestibular therapy recommended - Mupirocin ointment twice daily to the nose - He was told to avoid nasal manipulation.  He should not pick at the crusting.  He should also avoid trimming or plucking the nose hairs - If the nasal symptoms worsen, he was told to call me.  I will place him on antibiotics - Follow-up in 2 weeks to ensure the area of inflammation has resolved - He should call and return earlier if any problems

## 2024-07-11 ENCOUNTER — APPOINTMENT (OUTPATIENT)
Dept: OTOLARYNGOLOGY | Facility: CLINIC | Age: 74
End: 2024-07-11

## 2024-07-22 ENCOUNTER — APPOINTMENT (OUTPATIENT)
Dept: OTOLARYNGOLOGY | Facility: CLINIC | Age: 74
End: 2024-07-22
Payer: MEDICARE

## 2024-07-22 DIAGNOSIS — J38.7 OTHER DISEASES OF LARYNX: ICD-10-CM

## 2024-07-22 DIAGNOSIS — H90.3 SENSORINEURAL HEARING LOSS, BILATERAL: ICD-10-CM

## 2024-07-22 DIAGNOSIS — R42 DIZZINESS AND GIDDINESS: ICD-10-CM

## 2024-07-22 DIAGNOSIS — R13.14 DYSPHAGIA, PHARYNGOESOPHAGEAL PHASE: ICD-10-CM

## 2024-07-22 DIAGNOSIS — J00 ACUTE NASOPHARYNGITIS [COMMON COLD]: ICD-10-CM

## 2024-07-22 PROCEDURE — 99213 OFFICE O/P EST LOW 20 MIN: CPT

## 2024-07-22 NOTE — HISTORY OF PRESENT ILLNESS
[de-identified] : NOEL DEAN is a 73 year old patient With a history of hearing loss, reflux, laryngeal cyst, and dysphagia here for follow-up for left nasal vestibulitis.  He has been using ointment.  He feels much better.  He has not yet seen the gastroenterologist for the modified barium swallow findings.  Nor has he gone for vestibular therapy for the disequilibrium.  ENT History He is a former smoker who quit tobacco many years ago. He does smoke marijuana No history of reflux or recurrent tonsillitis 2 benign appearing laryngeal cysts on FL  No history of recurrent middle ear infections, prior otologic surgery, ear trauma, or excessive noise exposure He has bilateral sensorineural hearing loss with asymmetry in the right ear. MRI showed no IAC masses He is undergoing neurology work up

## 2024-07-22 NOTE — PHYSICAL EXAM
[TextEntry] : General: The patient was alert, oriented and in no distress. Voice was clear.  Face: The patient had no facial asymmetry or mass. The skin was unremarkable.  Ears: Hearing normal to conversational voice External ears were normal without deformity. External ear canals: no cerumen or inflammation Tympanic membranes: Intact. No perforation or effusion  Nose: The external nose had no significant deformity. . On anterior rhinoscopy, the nasal mucosa was clear. the area of inflammation had resolved. The anterior septum was grossly midline. There were no visualized polyps, purulence or masses.  Oral cavity: Oral mucosa- normal. Oral and base of tongue- clear and without mass. Gingival and buccal mucosa- moist and without lesions. Palate- the palate moved well. There was no cleft palate. There appeared to be good salivary flow. Oral cavity/oropharynx- no pus, erythema or mass   Neck: The neck was symmetrical. The parotid and submandibular glands were normal without masses. The trachea was midline and there was no unusual crepitus. Thyroid was smooth and nontender and no masses were palpated. No masses  Lymphatics: Cervical adenopathy- none.

## 2024-07-22 NOTE — ASSESSMENT
[FreeTextEntry1] : The nasal inflammation has resolved.  There were no lesions.  He has a history of reflux and dysphagia.  He has a history of chronic disequilibrium  He has 2 benign-appearing laryngeal cyst.  He has opted for observation.  He declined repeat flexible laryngoscopy today  Plan -Findings and management options were discussed with the patient. - Monitor hearing - I again recommended GI evaluation to review the modified barium swallow test results - Continue management of reflux - I again discussed management of the laryngeal cyst.  They do appear benign.  He was told however, the only way to rule out malignancy would be to biopsy them.  We also discussed obtaining a CT scan.  He would like to observe him for now - He will avoid nasal manipulation.  If he must trim the nasal hairs, I have asked him to use mupirocin ointment afterwards and to not trim too close to the skin -He was also concerned about a possible skin lesion on his nose.  I have recommended he see a dermatologist - Follow-up in 3 months if doing well - Call and return earlier if any problems

## 2024-09-05 ENCOUNTER — APPOINTMENT (OUTPATIENT)
Dept: ORTHOPEDIC SURGERY | Facility: CLINIC | Age: 74
End: 2024-09-05
Payer: MEDICARE

## 2024-09-05 VITALS — BODY MASS INDEX: 22.82 KG/M2 | HEIGHT: 66 IN | WEIGHT: 142 LBS

## 2024-09-05 DIAGNOSIS — M75.41 IMPINGEMENT SYNDROME OF RIGHT SHOULDER: ICD-10-CM

## 2024-09-05 PROCEDURE — 99214 OFFICE O/P EST MOD 30 MIN: CPT

## 2024-09-25 NOTE — ED PROVIDER NOTE - CPE EDP CARDIAC NORM
[FreeTextEntry6] : R septal deviation  Polypoid mucosa  ....   [FreeTextEntry3] : Nasal Endoscopy: right anterior septal deflection obstructing thick scab and clot in left nasal cavity - removed 1cm midseptal perforation e/o prior ESS w patent MM and limited ethmoid beds; polypoid tissue/polyps within ethmoid w frontoethmoid occlusion no obvious mucosal lesions or source of epistaxis normal...

## 2024-09-28 ENCOUNTER — NON-APPOINTMENT (OUTPATIENT)
Age: 74
End: 2024-09-28

## 2024-09-30 ENCOUNTER — EMERGENCY (EMERGENCY)
Facility: HOSPITAL | Age: 74
LOS: 1 days | Discharge: HOME | End: 2024-09-30
Attending: EMERGENCY MEDICINE | Admitting: EMERGENCY MEDICINE
Payer: MEDICARE

## 2024-09-30 VITALS
SYSTOLIC BLOOD PRESSURE: 122 MMHG | HEART RATE: 81 BPM | WEIGHT: 139.99 LBS | OXYGEN SATURATION: 98 % | TEMPERATURE: 98 F | DIASTOLIC BLOOD PRESSURE: 84 MMHG | HEIGHT: 66 IN | RESPIRATION RATE: 16 BRPM

## 2024-09-30 LAB
APPEARANCE UR: CLEAR — SIGNIFICANT CHANGE UP
BACTERIA # UR AUTO: ABNORMAL /HPF
BILIRUB UR-MCNC: NEGATIVE — SIGNIFICANT CHANGE UP
COLOR SPEC: YELLOW — SIGNIFICANT CHANGE UP
DIFF PNL FLD: NEGATIVE — SIGNIFICANT CHANGE UP
EPI CELLS # UR: PRESENT
GLUCOSE UR QL: NEGATIVE MG/DL — SIGNIFICANT CHANGE UP
KETONES UR-MCNC: ABNORMAL MG/DL
LEUKOCYTE ESTERASE UR-ACNC: NEGATIVE — SIGNIFICANT CHANGE UP
NITRITE UR-MCNC: NEGATIVE — SIGNIFICANT CHANGE UP
PH UR: 6 — SIGNIFICANT CHANGE UP (ref 5–8)
PROT UR-MCNC: ABNORMAL MG/DL
RBC CASTS # UR COMP ASSIST: 1 /HPF — SIGNIFICANT CHANGE UP (ref 0–4)
SP GR SPEC: 1.02 — SIGNIFICANT CHANGE UP (ref 1–1.03)
UROBILINOGEN FLD QL: 1 MG/DL — SIGNIFICANT CHANGE UP (ref 0.2–1)
WBC UR QL: 1 /HPF — SIGNIFICANT CHANGE UP (ref 0–5)

## 2024-09-30 PROCEDURE — 99284 EMERGENCY DEPT VISIT MOD MDM: CPT

## 2024-09-30 NOTE — ED PROVIDER NOTE - NSFOLLOWUPINSTRUCTIONS_ED_ALL_ED_FT
Please keep maciel in place until you see urologist  Follow up with Urologist tomorrow as previously scheduled  Return to the ED if develop issues with catheter pain or no drainage from bag

## 2024-09-30 NOTE — ED ADULT NURSE NOTE - OBJECTIVE STATEMENT
Pt presents to ed ambulatory, Pt AOX4, speaking in full clear sentences, breathing equal and unlabored  urinary retention since yesterday, s/p TURP 3 years ago, appt with urologist on tomorrow  Order for maciel-leg bag placed by provider  Urinary (Maciel) catheter inserted using sterile technique as ordered. Pt tolerated and urine sent  Plan of care ongoing

## 2024-09-30 NOTE — ED PROVIDER NOTE - CLINICAL SUMMARY MEDICAL DECISION MAKING FREE TEXT BOX
Patient with history of TURP a few years ago, well appearing, complaining of acute Urinary Retention. Bedside POCUS performed by me demonstrated distended full bladder. Khan cathether with leg bag placed by nurse. Patient experienced relief and over 1 L of urine drained. No significant urine findings enzo would warrant treatment for UTI. WIll discharge patient with leg bag and follow up with Urology as scheduled for tomorrow.

## 2024-09-30 NOTE — ED PROVIDER NOTE - OBJECTIVE STATEMENT
73 yo M, pmhx of TURP about 3 years ago per patient, complaining of urinary retention since this morning. Patient says he hasn't had this issue since prior to his turp but does follow with a Urologist. Has an appointment tomorrow but the Urologist told him to come to the ER. Patient says this morning he could only urinate "a little" no frequency, no urgency, no dysuria, no fever, chills, nausea, vomiting. Reports pressure mild in his suprapubic region.

## 2024-09-30 NOTE — ED PROVIDER NOTE - PATIENT PORTAL LINK FT
You can access the FollowMyHealth Patient Portal offered by [facility] by registering at the following website: [email or web address]. By joining [facility]’s FollowMyHealth portal, you will also be able to view your health information using other applications (apps) compatible with our system.

## 2024-10-02 DIAGNOSIS — R33.9 RETENTION OF URINE, UNSPECIFIED: ICD-10-CM

## 2024-10-21 ENCOUNTER — APPOINTMENT (OUTPATIENT)
Dept: OTOLARYNGOLOGY | Facility: CLINIC | Age: 74
End: 2024-10-21
Payer: MEDICARE

## 2024-10-21 DIAGNOSIS — R13.14 DYSPHAGIA, PHARYNGOESOPHAGEAL PHASE: ICD-10-CM

## 2024-10-21 DIAGNOSIS — H90.3 SENSORINEURAL HEARING LOSS, BILATERAL: ICD-10-CM

## 2024-10-21 DIAGNOSIS — K21.9 GASTRO-ESOPHAGEAL REFLUX DISEASE W/OUT ESOPHAGITIS: ICD-10-CM

## 2024-10-21 DIAGNOSIS — J38.7 OTHER DISEASES OF LARYNX: ICD-10-CM

## 2024-10-21 DIAGNOSIS — J31.2 CHRONIC PHARYNGITIS: ICD-10-CM

## 2024-10-21 PROCEDURE — 99213 OFFICE O/P EST LOW 20 MIN: CPT | Mod: 25

## 2024-10-21 PROCEDURE — 31575 DIAGNOSTIC LARYNGOSCOPY: CPT

## 2024-10-27 NOTE — PHYSICAL EXAM
There are no Wet Read(s) to document. [JVD] : no jugular venous distention  [2+] : left 2+ [Ankle Swelling (On Exam)] : not present [Ankle Swelling Bilaterally] : bilaterally  [Varicose Veins Of Lower Extremities] : bilaterally [] : bilaterally [No Rash or Lesion] : No rash or lesion [Purpura] : no purpura  [Petechiae] : no petechiae [Skin Ulcer] : no ulcer [Skin Induration] : no induration [Alert] : alert [Oriented to Person] : oriented to person [Oriented to Place] : oriented to place [Oriented to Time] : oriented to time [Calm] : calm [de-identified] : wdwn nad [de-identified] : wnl [de-identified] : elverl [FreeTextEntry1] : no varicose veins no homin's sign no mass rigth calf [de-identified] : wnl [de-identified] : wnl

## 2024-11-06 ENCOUNTER — NON-APPOINTMENT (OUTPATIENT)
Age: 74
End: 2024-11-06

## 2024-11-26 ENCOUNTER — APPOINTMENT (OUTPATIENT)
Dept: OTOLARYNGOLOGY | Facility: CLINIC | Age: 74
End: 2024-11-26
Payer: MEDICARE

## 2024-11-26 ENCOUNTER — NON-APPOINTMENT (OUTPATIENT)
Age: 74
End: 2024-11-26

## 2024-11-26 DIAGNOSIS — J31.2 CHRONIC PHARYNGITIS: ICD-10-CM

## 2024-11-26 DIAGNOSIS — K14.6 GLOSSODYNIA: ICD-10-CM

## 2024-11-26 DIAGNOSIS — J38.7 OTHER DISEASES OF LARYNX: ICD-10-CM

## 2024-11-26 DIAGNOSIS — K21.9 GASTRO-ESOPHAGEAL REFLUX DISEASE W/OUT ESOPHAGITIS: ICD-10-CM

## 2024-11-26 PROCEDURE — 31575 DIAGNOSTIC LARYNGOSCOPY: CPT

## 2024-11-26 PROCEDURE — 99213 OFFICE O/P EST LOW 20 MIN: CPT | Mod: 25

## 2024-12-07 NOTE — ED PROVIDER NOTE - PATIENT PORTAL LINK FT
Enteral, NPO with Tube Feeds
You can access the FollowMyHealth Patient Portal offered by [facility] by registering at the following website: [email or web address]. By joining [facility]’s FollowMyHealth portal, you will also be able to view your health information using other applications (apps) compatible with our system.

## 2024-12-12 ENCOUNTER — APPOINTMENT (OUTPATIENT)
Dept: NEUROLOGY | Facility: CLINIC | Age: 74
End: 2024-12-12
Payer: MEDICARE

## 2024-12-12 VITALS
TEMPERATURE: 97.7 F | DIASTOLIC BLOOD PRESSURE: 87 MMHG | OXYGEN SATURATION: 99 % | HEIGHT: 66 IN | HEART RATE: 73 BPM | SYSTOLIC BLOOD PRESSURE: 145 MMHG

## 2024-12-12 PROCEDURE — G2211 COMPLEX E/M VISIT ADD ON: CPT

## 2024-12-12 PROCEDURE — 99214 OFFICE O/P EST MOD 30 MIN: CPT

## 2025-01-21 ENCOUNTER — APPOINTMENT (OUTPATIENT)
Dept: VASCULAR SURGERY | Facility: CLINIC | Age: 75
End: 2025-01-21
Payer: MEDICARE

## 2025-01-21 VITALS
OXYGEN SATURATION: 98 % | BODY MASS INDEX: 22.5 KG/M2 | WEIGHT: 140 LBS | HEIGHT: 66 IN | TEMPERATURE: 97.1 F | DIASTOLIC BLOOD PRESSURE: 93 MMHG | SYSTOLIC BLOOD PRESSURE: 155 MMHG | HEART RATE: 68 BPM

## 2025-01-21 DIAGNOSIS — M25.462 EFFUSION, LEFT KNEE: ICD-10-CM

## 2025-01-21 DIAGNOSIS — I83.892 VARICOSE VEINS OF LEFT LOWER EXTREMITY WITH OTHER COMPLICATIONS: ICD-10-CM

## 2025-01-21 DIAGNOSIS — M79.89 OTHER SPECIFIED SOFT TISSUE DISORDERS: ICD-10-CM

## 2025-01-21 DIAGNOSIS — I87.2 VENOUS INSUFFICIENCY (CHRONIC) (PERIPHERAL): ICD-10-CM

## 2025-01-21 DIAGNOSIS — M71.22 SYNOVIAL CYST OF POPLITEAL SPACE [BAKER], LEFT KNEE: ICD-10-CM

## 2025-01-21 PROCEDURE — 93971 EXTREMITY STUDY: CPT | Mod: LT

## 2025-01-21 PROCEDURE — 99213 OFFICE O/P EST LOW 20 MIN: CPT | Mod: 25

## 2025-01-27 ENCOUNTER — NON-APPOINTMENT (OUTPATIENT)
Age: 75
End: 2025-01-27

## 2025-01-27 ENCOUNTER — APPOINTMENT (OUTPATIENT)
Dept: OTOLARYNGOLOGY | Facility: CLINIC | Age: 75
End: 2025-01-27
Payer: MEDICARE

## 2025-01-27 DIAGNOSIS — H90.3 SENSORINEURAL HEARING LOSS, BILATERAL: ICD-10-CM

## 2025-01-27 DIAGNOSIS — J38.7 OTHER DISEASES OF LARYNX: ICD-10-CM

## 2025-01-27 DIAGNOSIS — K21.9 GASTRO-ESOPHAGEAL REFLUX DISEASE W/OUT ESOPHAGITIS: ICD-10-CM

## 2025-01-27 PROCEDURE — 99213 OFFICE O/P EST LOW 20 MIN: CPT | Mod: 25

## 2025-01-27 PROCEDURE — 31575 DIAGNOSTIC LARYNGOSCOPY: CPT

## 2025-06-12 ENCOUNTER — APPOINTMENT (OUTPATIENT)
Dept: OTOLARYNGOLOGY | Facility: CLINIC | Age: 75
End: 2025-06-12
Payer: MEDICARE

## 2025-06-12 PROCEDURE — 92567 TYMPANOMETRY: CPT

## 2025-06-12 PROCEDURE — 99213 OFFICE O/P EST LOW 20 MIN: CPT | Mod: 25

## 2025-06-12 PROCEDURE — 31575 DIAGNOSTIC LARYNGOSCOPY: CPT

## 2025-06-12 PROCEDURE — 92557 COMPREHENSIVE HEARING TEST: CPT
